# Patient Record
Sex: FEMALE | Race: BLACK OR AFRICAN AMERICAN | Employment: FULL TIME | URBAN - METROPOLITAN AREA
[De-identification: names, ages, dates, MRNs, and addresses within clinical notes are randomized per-mention and may not be internally consistent; named-entity substitution may affect disease eponyms.]

---

## 2018-01-22 ENCOUNTER — HOSPITAL ENCOUNTER (EMERGENCY)
Age: 33
Discharge: HOME | End: 2018-01-22
Attending: EMERGENCY MEDICINE
Payer: COMMERCIAL

## 2018-01-22 ENCOUNTER — APPOINTMENT (OUTPATIENT)
Dept: GENERAL RADIOLOGY | Age: 33
End: 2018-01-22
Attending: PHYSICIAN ASSISTANT
Payer: COMMERCIAL

## 2018-01-22 VITALS
RESPIRATION RATE: 20 BRPM | SYSTOLIC BLOOD PRESSURE: 178 MMHG | OXYGEN SATURATION: 100 % | HEIGHT: 62 IN | BODY MASS INDEX: 45.27 KG/M2 | DIASTOLIC BLOOD PRESSURE: 110 MMHG | WEIGHT: 246 LBS | TEMPERATURE: 98.2 F | HEART RATE: 82 BPM

## 2018-01-22 DIAGNOSIS — R03.0 ELEVATED BLOOD PRESSURE READING: ICD-10-CM

## 2018-01-22 DIAGNOSIS — S30.0XXA CONTUSION OF COCCYX, INITIAL ENCOUNTER: Primary | ICD-10-CM

## 2018-01-22 PROCEDURE — 99283 EMERGENCY DEPT VISIT LOW MDM: CPT

## 2018-01-22 PROCEDURE — 74011250637 HC RX REV CODE- 250/637: Performed by: PHYSICIAN ASSISTANT

## 2018-01-22 PROCEDURE — 72100 X-RAY EXAM L-S SPINE 2/3 VWS: CPT

## 2018-01-22 RX ORDER — HYDROCODONE BITARTRATE AND ACETAMINOPHEN 5; 325 MG/1; MG/1
1 TABLET ORAL
Status: COMPLETED | OUTPATIENT
Start: 2018-01-22 | End: 2018-01-22

## 2018-01-22 RX ORDER — NAPROXEN 500 MG/1
500 TABLET ORAL 2 TIMES DAILY WITH MEALS
Qty: 20 TAB | Refills: 0 | Status: SHIPPED | OUTPATIENT
Start: 2018-01-22 | End: 2018-02-01

## 2018-01-22 RX ADMIN — HYDROCODONE BITARTRATE AND ACETAMINOPHEN 1 TABLET: 5; 325 TABLET ORAL at 19:51

## 2018-01-23 NOTE — ED PROVIDER NOTES
HPI Comments: Chief Complaint: low back pain  Duration:  1 week  Timing:  constant  Location: low back, coccyx  Quality: achy  Severity: moderate  Modifying Factors: worse with sitting  Associated Symptoms: none    29 yo F c/o low back pain and tailbone pain x 1 week. States she slipped on the ice and fell, landing on her buttocks. Pain worse with sitting. Has been taking aleve with some improvement in pain. Denies saddle anesthesia or incontinence. No other complaints. Patient is a 28 y.o. female presenting with coccyx pain. Tailbone Pain    Pertinent negatives include no fever and no abdominal pain. Past Medical History:   Diagnosis Date    Anemia NEC     Asthma      delivery     Gallstones     Gastrointestinal disorder        Past Surgical History:   Procedure Laterality Date    HX  SECTION      HX GASTRIC BYPASS      HX ORTHOPAEDIC      correction of bow legs         No family history on file. Social History     Social History    Marital status:      Spouse name: N/A    Number of children: N/A    Years of education: N/A     Occupational History    Not on file. Social History Main Topics    Smoking status: Current Every Day Smoker    Smokeless tobacco: Never Used      Comment: cigar 2 a day    Alcohol use Yes      Comment: occational   liquor drinker    Drug use: No    Sexual activity: Yes     Partners: Male     Birth control/ protection: Condom     Other Topics Concern    Not on file     Social History Narrative         ALLERGIES: Review of patient's allergies indicates no known allergies. Review of Systems   Constitutional: Negative for fever. Gastrointestinal: Negative for abdominal pain. Musculoskeletal: Positive for back pain. Negative for gait problem and neck pain. All other systems reviewed and are negative.       Vitals:    18 1817   BP: (!) 178/110   Pulse: 82   Resp: 20   Temp: 98.2 °F (36.8 °C)   SpO2: 100%   Weight: 111.6 kg (246 lb)   Height: 5' 2\" (1.575 m)            Physical Exam   Constitutional: She is oriented to person, place, and time. She appears well-developed and well-nourished. No distress. HENT:   Head: Normocephalic and atraumatic. Eyes: Conjunctivae are normal.   Neck: Normal range of motion. Neck supple. Cardiovascular: Normal rate, regular rhythm and normal heart sounds. Pulmonary/Chest: Effort normal and breath sounds normal. No respiratory distress. She has no wheezes. She has no rales. Musculoskeletal: Normal range of motion. Moderately TTP to left lumbar paraspinal muscles, left SI joint, sacrum and coccyx. No swelling or ecchymosis. Neurological: She is alert and oriented to person, place, and time. Skin: Skin is warm and dry. Psychiatric: She has a normal mood and affect. Her behavior is normal. Judgment and thought content normal.   Nursing note and vitals reviewed. MDM  Number of Diagnoses or Management Options  Contusion of coccyx, initial encounter:   Elevated blood pressure reading:     ED Course       Procedures    -------------------------------------------------------------------------------------------------------------------     EKG INTERPRETATIONS:      RADIOLOGY RESULTS:   XR SPINE LUMB 2 OR 3 V    (Results Pending)       LABORATORY RESULTS:  No results found for this or any previous visit (from the past 12 hour(s)). PROGRESS NOTES:    8:40 PM Pt well appearing and in NAD. Nothing acute on x-ray. No red flags. Will d/h to f/u with PCP for further eval.     Lengthy D/W pt regarding possible worsening of pt's condition, need for follow up and strict ED return instructions for any worsening symptoms. DISPOSITION:  ED DIAGNOSIS & DISPOSITION INFORMATION  Diagnosis:   1. Contusion of coccyx, initial encounter    2.  Elevated blood pressure reading          Disposition: home    Follow-up Information     Follow up With Details [address] [facility] EMERGENCY DEPT Immediately if symptoms worsen [address] [id number]  [phone]          Patient's Medications   Start Taking    MISCELLANEOUS MEDICAL SUPPLY [facility]    Donut cushion    NAPROXEN (NAPROSYN) 500 MG TABLET    Take 1 Tab by mouth two (2) times daily (with meals) for 10 days. Continue Taking    NAPROXEN (NAPROSYN) 500 MG TABLET    Take 1 Tab by mouth two (2) times daily (with meals).    These Medications have changed    No medications on file   Stop Taking    No medications on file

## 2018-01-23 NOTE — ED NOTES
Samuel Keller is a 28 y.o. female that was discharged in stable. Pt was accompanied by friend. Pt is not driving. The patients diagnosis, condition and treatment were explained to  patient and aftercare instructions were given. The patient verbalized understanding. Patient armband removed and shredded.

## 2018-02-02 ENCOUNTER — OFFICE VISIT (OUTPATIENT)
Dept: FAMILY MEDICINE CLINIC | Facility: CLINIC | Age: 33
End: 2018-02-02

## 2018-02-02 ENCOUNTER — HOSPITAL ENCOUNTER (INPATIENT)
Age: 33
LOS: 1 days | Discharge: HOME | DRG: 812 | End: 2018-02-04
Attending: EMERGENCY MEDICINE | Admitting: INTERNAL MEDICINE
Payer: COMMERCIAL

## 2018-02-02 VITALS
WEIGHT: 249 LBS | HEIGHT: 62 IN | HEART RATE: 101 BPM | BODY MASS INDEX: 45.82 KG/M2 | SYSTOLIC BLOOD PRESSURE: 146 MMHG | TEMPERATURE: 96.6 F | DIASTOLIC BLOOD PRESSURE: 62 MMHG | RESPIRATION RATE: 16 BRPM

## 2018-02-02 DIAGNOSIS — Z98.84 H/O GASTRIC BYPASS: ICD-10-CM

## 2018-02-02 DIAGNOSIS — D64.9 ANEMIA, UNSPECIFIED TYPE: Primary | ICD-10-CM
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
## 2018-02-02 DIAGNOSIS — R40.20 LOSS OF CONSCIOUSNESS (HCC): ICD-10-CM

## 2018-02-02 PROBLEM — D50.9 MICROCYTIC HYPOCHROMIC ANEMIA: Status: ACTIVE | Noted: 2018-02-02

## 2018-02-02 LAB
ALBUMIN SERPL-MCNC: 3.5 G/DL (ref 3.4–5)
ALBUMIN SERPL-MCNC: 3.6 G/DL (ref 3.4–5)
ALBUMIN/GLOB SERPL: 0.8 {RATIO} (ref 0.8–1.7)
ALBUMIN/GLOB SERPL: 0.9 {RATIO} (ref 0.8–1.7)
ALP SERPL-CCNC: 83 U/L (ref 45–117)
ALP SERPL-CCNC: 84 U/L (ref 45–117)
ALT SERPL-CCNC: 17 U/L (ref 13–56)
ALT SERPL-CCNC: 18 U/L (ref 13–56)
ANION GAP SERPL CALC-SCNC: 6 MMOL/L (ref 3–18)
ANION GAP SERPL CALC-SCNC: 8 MMOL/L (ref 3–18)
AST SERPL-CCNC: 18 U/L (ref 15–37)
AST SERPL-CCNC: 26 U/L (ref 15–37)
BASOPHILS # BLD: 0 K/UL (ref 0–0.06)
BASOPHILS NFR BLD: 0 % (ref 0–2)
BILIRUB DIRECT SERPL-MCNC: 0.1 MG/DL (ref 0–0.2)
BILIRUB SERPL-MCNC: 0.5 MG/DL (ref 0.2–1)
BILIRUB SERPL-MCNC: 0.5 MG/DL (ref 0.2–1)
BUN SERPL-MCNC: 10 MG/DL (ref 7–18)
BUN SERPL-MCNC: 10 MG/DL (ref 7–18)
BUN/CREAT SERPL: 15 (ref 12–20)
BUN/CREAT SERPL: 15 (ref 12–20)
CALCIUM SERPL-MCNC: 8.7 MG/DL (ref 8.5–10.1)
CALCIUM SERPL-MCNC: 8.8 MG/DL (ref 8.5–10.1)
CHLORIDE SERPL-SCNC: 107 MMOL/L (ref 100–108)
CHLORIDE SERPL-SCNC: 107 MMOL/L (ref 100–108)
CO2 SERPL-SCNC: 26 MMOL/L (ref 21–32)
CO2 SERPL-SCNC: 26 MMOL/L (ref 21–32)
CREAT SERPL-MCNC: 0.65 MG/DL (ref 0.6–1.3)
CREAT SERPL-MCNC: 0.67 MG/DL (ref 0.6–1.3)
DIFFERENTIAL METHOD BLD: ABNORMAL
EOSINOPHIL # BLD: 0.4 K/UL (ref 0–0.4)
EOSINOPHIL NFR BLD: 4 % (ref 0–5)
ERYTHROCYTE [DISTWIDTH] IN BLOOD BY AUTOMATED COUNT: 29.4 % (ref 11.6–14.5)
FOLATE SERPL-MCNC: 10.5 NG/ML (ref 3.1–17.5)
GLOBULIN SER CALC-MCNC: 4.1 G/DL (ref 2–4)
GLOBULIN SER CALC-MCNC: 4.8 G/DL (ref 2–4)
GLUCOSE SERPL-MCNC: 78 MG/DL (ref 74–99)
GLUCOSE SERPL-MCNC: 82 MG/DL (ref 74–99)
HCG SERPL QL: NEGATIVE
HCT VFR BLD AUTO: 16.9 % (ref 35–45)
HGB BLD-MCNC: 4.4 G/DL (ref 12–16)
HGB BLD-MCNC: 4.5 G/DL
IRON SATN MFR SERPL: 2 %
IRON SERPL-MCNC: 13 UG/DL (ref 50–175)
LYMPHOCYTES # BLD: 3 K/UL (ref 0.9–3.6)
LYMPHOCYTES NFR BLD: 31 % (ref 21–52)
MAGNESIUM SERPL-MCNC: 2.3 MG/DL (ref 1.6–2.6)
MCH RBC QN AUTO: 15.1 PG (ref 24–34)
MCHC RBC AUTO-ENTMCNC: 26 G/DL (ref 31–37)
MCV RBC AUTO: 57.9 FL (ref 74–97)
MONOCYTES # BLD: 0.6 K/UL (ref 0.05–1.2)
MONOCYTES NFR BLD: 6 % (ref 3–10)
NEUTS SEG # BLD: 5.7 K/UL (ref 1.8–8)
NEUTS SEG NFR BLD: 59 % (ref 40–73)
PLATELET # BLD AUTO: 615 K/UL (ref 135–420)
PLATELET COMMENTS,PCOM: ABNORMAL
POTASSIUM SERPL-SCNC: 3.7 MMOL/L (ref 3.5–5.5)
POTASSIUM SERPL-SCNC: 4.1 MMOL/L (ref 3.5–5.5)
PROT SERPL-MCNC: 7.6 G/DL (ref 6.4–8.2)
PROT SERPL-MCNC: 8.4 G/DL (ref 6.4–8.2)
RBC # BLD AUTO: 2.92 M/UL (ref 4.2–5.3)
RBC MORPH BLD: ABNORMAL
RETICS/RBC NFR AUTO: 0.4 % (ref 0.5–2.3)
SODIUM SERPL-SCNC: 139 MMOL/L (ref 136–145)
SODIUM SERPL-SCNC: 141 MMOL/L (ref 136–145)
TIBC SERPL-MCNC: 571 UG/DL (ref 250–450)
VIT B12 SERPL-MCNC: 359 PG/ML (ref 211–911)
WBC # BLD AUTO: 9.7 K/UL (ref 4.6–13.2)

## 2018-02-02 PROCEDURE — 85025 COMPLETE CBC W/AUTO DIFF WBC: CPT | Performed by: PHYSICIAN ASSISTANT

## 2018-02-02 PROCEDURE — 99284 EMERGENCY DEPT VISIT MOD MDM: CPT

## 2018-02-02 PROCEDURE — 83615 LACTATE (LD) (LDH) ENZYME: CPT | Performed by: PHYSICIAN ASSISTANT

## 2018-02-02 PROCEDURE — 83010 ASSAY OF HAPTOGLOBIN QUANT: CPT | Performed by: PHYSICIAN ASSISTANT

## 2018-02-02 PROCEDURE — 80053 COMPREHEN METABOLIC PANEL: CPT

## 2018-02-02 PROCEDURE — 36430 TRANSFUSION BLD/BLD COMPNT: CPT

## 2018-02-02 PROCEDURE — 86920 COMPATIBILITY TEST SPIN: CPT

## 2018-02-02 PROCEDURE — 85045 AUTOMATED RETICULOCYTE COUNT: CPT | Performed by: PHYSICIAN ASSISTANT

## 2018-02-02 PROCEDURE — 82746 ASSAY OF FOLIC ACID SERUM: CPT | Performed by: PHYSICIAN ASSISTANT

## 2018-02-02 PROCEDURE — 83021 HEMOGLOBIN CHROMOTOGRAPHY: CPT | Performed by: PHYSICIAN ASSISTANT

## 2018-02-02 PROCEDURE — 86900 BLOOD TYPING SEROLOGIC ABO: CPT

## 2018-02-02 PROCEDURE — 82248 BILIRUBIN DIRECT: CPT | Performed by: PHYSICIAN ASSISTANT

## 2018-02-02 PROCEDURE — 83550 IRON BINDING TEST: CPT | Performed by: PHYSICIAN ASSISTANT

## 2018-02-02 PROCEDURE — 80053 COMPREHEN METABOLIC PANEL: CPT | Performed by: PHYSICIAN ASSISTANT

## 2018-02-02 PROCEDURE — 84703 CHORIONIC GONADOTROPIN ASSAY: CPT | Performed by: PHYSICIAN ASSISTANT

## 2018-02-02 PROCEDURE — P9016 RBC LEUKOCYTES REDUCED: HCPCS

## 2018-02-02 PROCEDURE — 83735 ASSAY OF MAGNESIUM: CPT | Performed by: PHYSICIAN ASSISTANT

## 2018-02-02 RX ORDER — ALBUTEROL SULFATE 1.25 MG/3ML
1.25 SOLUTION RESPIRATORY (INHALATION)
Status: DISCONTINUED | OUTPATIENT
Start: 2018-02-02 | End: 2018-02-04 | Stop reason: HOSPADM

## 2018-02-02 RX ORDER — ACETAMINOPHEN 325 MG/1
650 TABLET ORAL
Status: DISCONTINUED | OUTPATIENT
Start: 2018-02-02 | End: 2018-02-04 | Stop reason: SDUPTHER

## 2018-02-02 RX ORDER — SODIUM CHLORIDE 9 MG/ML
250 INJECTION, SOLUTION INTRAVENOUS AS NEEDED
Status: DISCONTINUED | OUTPATIENT
Start: 2018-02-02 | End: 2018-02-03

## 2018-02-02 RX ORDER — SODIUM CHLORIDE 0.9 % (FLUSH) 0.9 %
5-10 SYRINGE (ML) INJECTION AS NEEDED
Status: DISCONTINUED | OUTPATIENT
Start: 2018-02-02 | End: 2018-02-04 | Stop reason: HOSPADM

## 2018-02-02 RX ORDER — ONDANSETRON 2 MG/ML
4 INJECTION INTRAMUSCULAR; INTRAVENOUS
Status: DISCONTINUED | OUTPATIENT
Start: 2018-02-02 | End: 2018-02-04 | Stop reason: HOSPADM

## 2018-02-02 RX ORDER — FAMOTIDINE 20 MG/1
20 TABLET, FILM COATED ORAL 2 TIMES DAILY
Status: DISCONTINUED | OUTPATIENT
Start: 2018-02-03 | End: 2018-02-03

## 2018-02-02 RX ORDER — SODIUM CHLORIDE 0.9 % (FLUSH) 0.9 %
5-10 SYRINGE (ML) INJECTION EVERY 8 HOURS
Status: DISCONTINUED | OUTPATIENT
Start: 2018-02-02 | End: 2018-02-04 | Stop reason: HOSPADM

## 2018-02-02 NOTE — PATIENT INSTRUCTIONS
Anemia: Care Instructions  Your Care Instructions    Anemia is a low level of red blood cells, which carry oxygen throughout your body. Many things can cause anemia. Lack of iron is one of the most common causes. Your body needs iron to make hemoglobin, a substance in red blood cells that carries oxygen from the lungs to your body's cells. Without enough iron, the body produces fewer and smaller red blood cells. As a result, your body's cells do not get enough oxygen, and you feel tired and weak. And you may have trouble concentrating. Bleeding is the most common cause of a lack of iron. You may have heavy menstrual bleeding or bleeding caused by conditions such as ulcers, hemorrhoids, or cancer. Regular use of aspirin or other anti-inflammatory medicines (such as ibuprofen) also can cause bleeding in some people. A lack of iron in your diet also can cause anemia, especially at times when the body needs more iron, such as during pregnancy, infancy, and the teen years. Your doctor may have prescribed iron pills. It may take several months of treatment for your iron levels to return to normal. Your doctor also may suggest that you eat foods that are rich in iron, such as meat and beans. There are many other causes of anemia. It is not always due to a lack of iron. Finding the specific cause of your anemia will help your doctor find the right treatment for you. Follow-up care is a key part of your treatment and safety. Be sure to make and go to all appointments, and call your doctor if you are having problems. It's also a good idea to know your test results and keep a list of the medicines you take. How can you care for yourself at home? · Take your medicines exactly as prescribed. Call your doctor if you think you are having a problem with your medicine. · If your doctor recommends iron pills, take them as directed:  ¨ Try to take the pills on an empty stomach about 1 hour before or 2 hours after meals. But you may need to take iron with food to avoid an upset stomach. ¨ Do not take antacids or drink milk or caffeine drinks (such as coffee, tea, or cola) at the same time or within 2 hours of the time that you take your iron. They can make it hard for your body to absorb the iron. ¨ Vitamin C (from food or supplements) helps your body absorb iron. Try taking iron pills with a glass of orange juice or some other food that is high in vitamin C, such as citrus fruits. ¨ Iron pills may cause stomach problems, such as heartburn, nausea, diarrhea, constipation, and cramps. Be sure to drink plenty of fluids, and include fruits, vegetables, and fiber in your diet each day. Iron pills often make your bowel movements dark or green. ¨ If you forget to take an iron pill, do not take a double dose of iron the next time you take a pill. ¨ Keep iron pills out of the reach of small children. An overdose of iron can be very dangerous. · Follow your doctor's advice about eating iron-rich foods. These include red meat, shellfish, poultry, eggs, beans, raisins, whole-grain bread, and leafy green vegetables. · Steam vegetables to help them keep their iron content. When should you call for help? Call 911 anytime you think you may need emergency care. For example, call if:  ? · You have symptoms of a heart attack. These may include:  ¨ Chest pain or pressure, or a strange feeling in the chest.  ¨ Sweating. ¨ Shortness of breath. ¨ Nausea or vomiting. ¨ Pain, pressure, or a strange feeling in the back, neck, jaw, or upper belly or in one or both shoulders or arms. ¨ Lightheadedness or sudden weakness. ¨ A fast or irregular heartbeat. After you call 911, the  may tell you to chew 1 adult-strength or 2 to 4 low-dose aspirin. Wait for an ambulance. Do not try to drive yourself. ? · You passed out (lost consciousness).    ?Call your doctor now or seek immediate medical care if:  ? · You have new or increased shortness of breath. ? · You are dizzy or lightheaded, or you feel like you may faint. ? · Your fatigue and weakness continue or get worse. ? · You have any abnormal bleeding, such as:  ¨ Nosebleeds. ¨ Vaginal bleeding that is different (heavier, more frequent, at a different time of the month) than what you are used to. ¨ Bloody or black stools, or rectal bleeding. ¨ Bloody or pink urine. ? Watch closely for changes in your health, and be sure to contact your doctor if:  ? · You do not get better as expected. Where can you learn more? Go to http://yaa-lucinaa.info/. Enter R301 in the search box to learn more about \"Anemia: Care Instructions. \"  Current as of: October 13, 2016  Content Version: 11.4  © 8610-9988 [employer]. Care instructions adapted under license by [employer] (which disclaims liability or warranty for this information). If you have questions about a medical condition or this instruction, always ask your healthcare professional. [place] [address] any warranty or liability for your use of this information. Preventing Falls: Care Instructions  Your Care Instructions    Getting around your home safely can be a challenge if you have injuries or health problems that make it easy for you to fall. Loose rugs and furniture in walkways are among the dangers for many older people who have problems walking or who have poor eyesight. People who have conditions such as arthritis, osteoporosis, or dementia also have to be careful not to fall. You can make your home safer with a few simple measures. Follow-up care is a key part of your treatment and safety. Be sure to make and go to all appointments, and call your doctor if you are having problems. It's also a good idea to know your test results and keep a list of the medicines you take. How can you care for yourself at home?   Taking care of yourself  · You may get dizzy if you do not drink enough water. To prevent dehydration, drink plenty of fluids, enough so that your urine is light yellow or clear like water. Choose water and other caffeine-free clear liquids. If you have kidney, heart, or liver disease and have to limit fluids, talk with your doctor before you increase the amount of fluids you drink. · Exercise regularly to improve your strength, muscle tone, and balance. Walk if you can. Swimming may be a good choice if you cannot walk easily. · Have your vision and hearing checked each year or any time you notice a change. If you have trouble seeing and hearing, you might not be able to avoid objects and could lose your balance. · Know the side effects of the medicines you take. Ask your doctor or pharmacist whether the medicines you take can affect your balance. Sleeping pills or sedatives can affect your balance. · Limit the amount of alcohol you drink. Alcohol can impair your balance and other senses. · Ask your doctor whether calluses or corns on your feet need to be removed. If you wear loose-fitting shoes because of calluses or corns, you can lose your balance and fall. · Talk to your doctor if you have numbness in your feet. Preventing falls at home  · Remove raised doorway thresholds, throw rugs, and clutter. Repair loose carpet or raised areas in the floor. · Move furniture and electrical cords to keep them out of walking paths. · Use nonskid floor wax, and wipe up spills right away, especially on ceramic tile floors. · If you use a walker or cane, put rubber tips on it. If you use crutches, clean the bottoms of them regularly with an abrasive pad, such as steel wool. · Keep your house well lit, especially [place], and outside walkways. Use night-lights in areas such as hallways and bathrooms.  Add extra light switches or use remote switches (such as switches that go on or off when you clap your hands) to make it easier to turn lights on if you have to get up during the night. · Install sturdy handrails on stairways. · Move items in your cabinets so that the things you use a lot are on the lower shelves (about waist level). · Keep a cordless phone and a flashlight with new batteries by your bed. If possible, put a phone in each of the main rooms of your house, or carry a cell phone in case you fall and cannot reach a phone. Or, you can wear a device around your neck or wrist. You push a button that sends a signal for help. · Wear low-heeled shoes that fit well and give your feet good support. Use footwear with nonskid soles. Check the heels and soles of your shoes for wear. Repair or replace worn heels or soles. · Do not wear socks without shoes on wood floors. · Walk on the grass when the sidewalks are slippery. If you live in an area that gets snow and ice in the winter, sprinkle salt on slippery steps and sidewalks. Preventing falls in the bath  · Install grab bars and nonskid mats inside and outside your shower or tub and near the toilet and sinks. · Use shower chairs and bath benches. · Use a hand-held shower head that will allow you to sit while showering. · Get into a tub or shower by putting the weaker leg in first. Get out of a tub or shower with your strong side first.  · Repair loose toilet seats and consider installing a raised toilet seat to make getting on and off the toilet easier. · Keep your bathroom door unlocked while you are in the shower. Where can you learn more? Go to http://yaa-luciana.info/. Enter 0476 79 69 [id number] in the search box to learn more about \"Preventing Falls: Care Instructions. \"  Current as of: May 12, 2017  Content Version: 11.4  © 7959-0648 [employer]. Care instructions adapted under license by [employer] (which disclaims liability or warranty for this information).  If you have questions about a medical condition or this instruction, always ask your healthcare professional. [employer] disclaims any warranty or liability for your use of this information.

## 2018-02-02 NOTE — MR AVS SNAPSHOT
[address] [facility] 
 
 
 [address] [facility] [address] [facility] [address] 
[phone] Patient: Arjun Stapleton MRN: [id number] :1985 Visit Information Date & Time Provider Department Dept. Phone Encounter #  
 2018  3:45 PM Marisa BruceremyABRIL [employer] [phone] [id number] Follow-up Instructions Return in about 1 week (around 2018), or if symptoms worsen or fail to improve. Your Appointments 2018 12:00 PM  
New Patient with Tono Niño MD  
[facility] [address] [place] [address]) Appt Note: np Back and thigh pain bring pic id, ins crd, list of med  arrive 30 min early   
 [address] [facility], [address]  
  
   
 [address] [facility], [address] [facility] [address] Upcoming Health Maintenance Date Due Pneumococcal 19-64 Medium Risk (1 of 1 - PPSV23) 2004 DTaP/Tdap/Td series (1 - Tdap) 2006 PAP AKA CERVICAL CYTOLOGY 2006 Influenza Age 5 to Adult 2017 Allergies as of 2018  Review Complete On: 2018 By: Malik Shown No Known Allergies Current Immunizations  Never Reviewed No immunizations on file. Not reviewed this visit You Were Diagnosed With   
  
 Codes Comments Anemia, unspecified type    -  Primary ICD-10-CM: D64.9 ICD-9-CM: 849. 9 Class 3 obesity with serious comorbidity and body mass index (BMI) of 45.0 to 49.9 in adult, unspecified obesity type ([facility] [address]     ICD-10-CM: E66.9, Z68.42 
ICD-9-CM: 278.00, V85.42 [facility]     ICD-10-CM: R40.20 ICD-9-CM: 780.09 Fall, initial encounter     ICD-10-CM: W19. Leanne Rodriguez ICD-9-CM: E888.9 H/O gastric bypass     ICD-10-CM: Z98.890 ICD-9-CM: V45.86 Vitals BP Pulse Temp Resp Height(growth percentile) Weight(growth percentile) 146/62 (!) 101 96.6 °F (35.9 °C) 16 5' 1.5\" (1.562 m) 249 lb (112.9 kg) LMP BMI OB Status Smoking Status 11/20/2017 46.29 kg/m2 Unknown Current Every Day Smoker Vitals History BMI and BSA Data Body Mass Index Body Surface Area  
 46.29 kg/m 2 2.21 m 2 Preferred Pharmacy Pharmacy Name Phone [facility] [address] [place] [address] [phone] Your Updated Medication List  
  
   
This list is accurate as of: 2/2/18  4:44 PM.  Always use your most recent med list.  
  
  
  
  
 miscellaneous medical supply Misc Donut cushion  
  
 naproxen 500 mg tablet Commonly known as:  NAPROSYN Take 1 Tab by mouth two (2) times daily (with meals). We Performed the Following AMB POC HEMOGLOBIN (HGB) [38848 CPT(R)] Follow-up Instructions Return in about 1 week (around 2/9/2018), or if symptoms worsen or fail to improve. To-Do List   
 02/02/2018 Lab:  CBC WITH AUTOMATED DIFF   
  
 02/02/2018 Lab:  HEMOGLOBIN A1C WITH EAG   
  
 02/02/2018 Lab:  METABOLIC PANEL, COMPREHENSIVE   
  
 02/02/2018 Lab:  TSH 3RD GENERATION   
  
 02/09/2018 Imaging:  CT HEAD W CONT   
  
 02/15/2018 Lab:  LIPID PANEL Referral Information Referral ID Referred By Referred To  
  
 [id number] Domenica SANTIAGO Not Available Visits Status Start Date End Date 1 New Request 2/2/18 2/2/19 If your referral has a status of pending review or denied, additional information will be sent to support the outcome of this decision. Patient Instructions Anemia: Care Instructions Your Care Instructions Anemia is a low level of red blood cells, which carry oxygen throughout your body. Many things can cause anemia. Lack of iron is one of the most common causes.  Your body needs iron to make hemoglobin, a substance in red blood cells that carries oxygen from the lungs to your body's cells. Without enough iron, the body produces fewer and smaller red blood cells. As a result, your body's cells do not get enough oxygen, and you feel tired and weak. And you may have trouble concentrating. Bleeding is the most common cause of a lack of iron. You may have heavy menstrual bleeding or bleeding caused by conditions such as ulcers, hemorrhoids, or cancer. Regular use of aspirin or other anti-inflammatory medicines (such as ibuprofen) also can cause bleeding in some people. A lack of iron in your diet also can cause anemia, especially at times when the body needs more iron, such as during pregnancy, infancy, and the teen years. Your doctor may have prescribed iron pills. It may take several months of treatment for your iron levels to return to normal. Your doctor also may suggest that you eat foods that are rich in iron, such as meat and beans. There are many other causes of anemia. It is not always due to a lack of iron. Finding the specific cause of your anemia will help your doctor find the right treatment for you. Follow-up care is a key part of your treatment and safety. Be sure to make and go to all appointments, and call your doctor if you are having problems. It's also a good idea to know your test results and keep a list of the medicines you take. How can you care for yourself at home? · Take your medicines exactly as prescribed. Call your doctor if you think you are having a problem with your medicine. · If your doctor recommends iron pills, take them as directed: ¨ Try to take the pills on an empty stomach about 1 hour before or 2 hours after meals. But you may need to take iron with food to avoid an upset stomach.  
¨ Do not take antacids or drink milk or caffeine drinks (such as coffee, tea, or cola) at the same time or within 2 hours of the time that you take your iron. They can make it hard for your body to absorb the iron. ¨ Vitamin C (from food or supplements) helps your body absorb iron. Try taking iron pills with a glass of orange juice or some other food that is high in vitamin C, such as citrus fruits. ¨ Iron pills may cause stomach problems, such as heartburn, nausea, diarrhea, constipation, and cramps. Be sure to drink plenty of fluids, and include fruits, vegetables, and fiber in your diet each day. Iron pills often make your bowel movements dark or green. ¨ If you forget to take an iron pill, do not take a double dose of iron the next time you take a pill. ¨ Keep iron pills out of the reach of small children. An overdose of iron can be very dangerous. · Follow your doctor's advice about eating iron-rich foods. These include red meat, shellfish, poultry, eggs, beans, raisins, whole-grain bread, and leafy green vegetables. · Steam vegetables to help them keep their iron content. When should you call for help? Call 911 anytime you think you may need emergency care. For example, call if: 
? · You have symptoms of a heart attack. These may include: ¨ Chest pain or pressure, or a strange feeling in the chest. 
¨ Sweating. ¨ Shortness of breath. ¨ Nausea or vomiting. ¨ Pain, pressure, or a strange feeling in the back, neck, jaw, or upper belly or in one or both shoulders or arms. ¨ Lightheadedness or sudden weakness. ¨ A fast or irregular heartbeat. After you call 911, the  may tell you to chew 1 adult-strength or 2 to 4 low-dose aspirin. Wait for an ambulance. Do not try to drive yourself. ? · You passed out (lost consciousness). ?Call your doctor now or seek immediate medical care if: 
? · You have new or increased shortness of breath. ? · You are dizzy or lightheaded, or you feel like you may faint. ? · Your fatigue and weakness continue or get worse. ? · You have any abnormal bleeding, such as: 
¨ Nosebleeds. ¨ Vaginal bleeding that is different (heavier, more frequent, at a different time of the month) than what you are used to. ¨ Bloody or black stools, or rectal bleeding. ¨ Bloody or pink urine. ? Watch closely for changes in your health, and be sure to contact your doctor if: 
? · You do not get better as expected. Where can you learn more? Go to http://yaa-luciana.info/. Enter R301 in the search box to learn more about \"Anemia: Care Instructions. \" Current as of: October 13, 2016 Content Version: 11.4 © 2806-7522 [employer]. Care instructions adapted under license by [employer] (which disclaims liability or warranty for this information). If you have questions about a medical condition or this instruction, always ask your healthcare professional. [address] any warranty or liability for your use of this information. Preventing Falls: Care Instructions Your Care Instructions Getting around your home safely can be a challenge if you have injuries or health problems that make it easy for you to fall. Loose rugs and furniture in walkways are among the dangers for many older people who have problems walking or who have poor eyesight. People who have conditions such as arthritis, osteoporosis, or dementia also have to be careful not to fall. You can make your home safer with a few simple measures. Follow-up care is a key part of your treatment and safety. Be sure to make and go to all appointments, and call your doctor if you are having problems. It's also a good idea to know your test results and keep a list of the medicines you take. How can you care for yourself at home? Taking care of yourself · You may get dizzy if you do not drink enough water. To prevent dehydration, drink plenty of fluids, enough so that your urine is light yellow or clear like water.  Choose water and other caffeine-free clear liquids. If you have kidney, heart, or liver disease and have to limit fluids, talk with your doctor before you increase the amount of fluids you drink. · Exercise regularly to improve your strength, muscle tone, and balance. Walk if you can. Swimming may be a good choice if you cannot walk easily. · Have your vision and hearing checked each year or any time you notice a change. If you have trouble seeing and hearing, you might not be able to avoid objects and could lose your balance. · Know the side effects of the medicines you take. Ask your doctor or pharmacist whether the medicines you take can affect your balance. Sleeping pills or sedatives can affect your balance. · Limit the amount of alcohol you drink. Alcohol can impair your balance and other senses. · Ask your doctor whether calluses or corns on your feet need to be removed. If you wear loose-fitting shoes because of calluses or corns, you can lose your balance and fall. · Talk to your doctor if you have numbness in your feet. Preventing falls at home · Remove raised doorway thresholds, throw rugs, and clutter. Repair loose carpet or raised areas in the floor. · Move furniture and electrical cords to keep them out of walking paths. · Use nonskid floor wax, and wipe up spills right away, especially on ceramic tile floors. · If you use a walker or cane, put rubber tips on it. If you use crutches, clean the bottoms of them regularly with an abrasive pad, such as steel wool. · Keep your house well lit, especially [place], and outside walkways. Use night-lights in areas such as hallways and bathrooms. Add extra light switches or use remote switches (such as switches that go on or off when you clap your hands) to make it easier to turn lights on if you have to get up during the night. · Install sturdy handrails on stairways. · Move items in your cabinets so that the things you use a lot are on the lower shelves (about waist level). · Keep a cordless phone and a flashlight with new batteries by your bed. If possible, put a phone in each of the main rooms of your house, or carry a cell phone in case you fall and cannot reach a phone. Or, you can wear a device around your neck or wrist. You push a button that sends a signal for help. · Wear low-heeled shoes that fit well and give your feet good support. Use footwear with nonskid soles. Check the heels and soles of your shoes for wear. Repair or replace worn heels or soles. · Do not wear socks without shoes on wood floors. · Walk on the grass when the sidewalks are slippery. If you live in an area that gets snow and ice in the winter, sprinkle salt on slippery steps and sidewalks. Preventing falls in the bath · Install grab bars and nonskid mats inside and outside your shower or tub and near the toilet and sinks. · Use shower chairs and bath benches. · Use a hand-held shower head that will allow you to sit while showering. · Get into a tub or shower by putting the weaker leg in first. Get out of a tub or shower with your strong side first. 
· Repair loose toilet seats and consider installing a raised toilet seat to make getting on and off the toilet easier. · Keep your bathroom door unlocked while you are in the shower. Where can you learn more? Go to http://yaa-luciana.info/. Enter 0476 79 69 [phone] in the search box to learn more about \"Preventing Falls: Care Instructions. \" Current as of: May 12, 2017 Content Version: 11.4 © 7072-8475 [employer]. Care instructions adapted under license by [employer] (which disclaims liability or warranty for this information). If you have questions about a medical condition or this instruction, always ask your healthcare professional. [address] any warranty or liability for your use of this information. Introducing hospitals & HEALTH SERVICES! Dear Penelope Course: Thank you for requesting a [employer] account. Our records indicate that you already have an active [employer] account. You can access your account anytime at [email or web address] Did you know that you can access your hospital and ER discharge instructions at any time in [employer]? You can also review all of your test results from your hospital stay or ER visit. Additional Information If you have questions, please visit the Frequently Asked Questions section of the [employer] website at [email or web address] Remember, [employer]ia is NOT to be used for urgent needs. For medical emergencies, dial 911. Now available from your iPhone and Android! Please provide this summary of care documentation to your next provider. Your primary care clinician is listed as NONE. If you have any questions after today's visit, please call [phone].

## 2018-02-02 NOTE — IP AVS SNAPSHOT
[address] Patient: Chary Monge MRN: [id number] :1985 A check live indicates which time of day the medication should be taken. My Medications START taking these medications Instructions Each Dose to Equal  
 Morning Noon Evening Bedtime  
 docusate sodium 100 mg capsule Commonly known as:  Aloma Dears Your next dose is:  2018 Take 1 Cap by mouth two (2) times a day. 100 mg  
    
   
   
   
  
 ferrous sulfate 325 mg (65 mg iron) EC tablet Commonly known as:  IRON Your next dose is:  2018 Take 1 Tab by mouth Daily (before breakfast). 325 mg  
    
   
   
   
  
 nicotine 7 mg/24 hr  
Commonly known as:  Jolan Shines Your next dose is:  2018 1 Patch by TransDERmal route every twenty-four (24) hours. 1 Patch OTHER Check CBC, CMP, Mg in 3 days, results to PCP immediately, diagnosis- Anemia OTHER Notes to Patient:  10 times an hour Incentive spirometry- use as directed OTHER  
   
 Graded Compression Stockings b/l LE- use as directed OTHER Physical Therapy- Evaluate and Treat OTHER Your next dose is:  2018 Multivitamin- Patient states she was on a gummy vitamin recommended by her bariatric surgeon and I have advised her to resume that as directed OTHER This is to certify that Rosa M Johnston was admitted to [facility] on 18 and discharged on 18 , and has been advised to take rest at home for 7 ( seven ) more days and then resume work if symptom free. pantoprazole 40 mg tablet Commonly known as:  PROTONIX Your next dose is:  2018 Take 1 Tab by mouth daily.   
 40 mg  
    
   
   
   
  
  
 STOP taking these medications   
 naproxen 500 mg tablet Commonly known as:  NAPROSYN  
   
  
  
ASK your doctor about these medications Instructions Each Dose to Equal  
 Morning Noon Evening Bedtime  
 miscellaneous medical supply Misc Donut cushion Where to Get Your Medications Information on where to get these meds will be given to you by the nurse or doctor. ! Ask your nurse or doctor about these medications  
  docusate sodium 100 mg capsule  
 ferrous sulfate 325 mg (65 mg iron) EC tablet  
 nicotine 7 mg/24 hr  
 OTHER  
 pantoprazole 40 mg tablet

## 2018-02-02 NOTE — ED TRIAGE NOTES
Pt states was sent here by PCP due to hemoglobin of 4.  Pt presents lethargic with some pallor to lips

## 2018-02-02 NOTE — ED PROVIDER NOTES
EMERGENCY DEPARTMENT HISTORY AND PHYSICAL EXAM    6:39 PM      Date: 2018  Patient Name: Lobo Kirkland    History of Presenting Illness     Chief Complaint   Patient presents with    Anemia         History Provided By: Patient    Chief Complaint: fatigue, headaches, low h/h  Duration:  months  Timing:  chronic  Location: diffuse HA  Quality: Aching  Severity: Mild  Modifying Factors: None  Associated Symptoms: syncope, numbness/tingling, weakness      Additional History (Context): Lobo Kirkland is a 28 y.o. female with anemia, asthma and past surgical hx of gastric bypass who presents with c/o intermittent headaches and fatigue x months. She was evaluated by a new PMD today who found her hemoglobin was 4.5, she was then sent to the ED. Pt notes hx of anemia, had 1 iron transfusion in . Does not take iron tablets. No hx of blood transfusion. No hx of hematology work-up in the past. Denies fever/chills, dyspnea on exertion, chest pain on exertion, n/v, hematemesis, melena, BRBPR, vaginal bleeding. LMP: November, hx of irregular cycles in the past.     PCP: None    Current Facility-Administered Medications   Medication Dose Route Frequency Provider Last Rate Last Dose    0.9% sodium chloride infusion 250 mL  250 mL IntraVENous PRN Nadya Ends [place]         Current Outpatient Prescriptions   Medication Sig Dispense Refill    miscellaneous medical supply [facility] Donut cushion 1 Each 0    naproxen (NAPROSYN) 500 mg tablet Take 1 Tab by mouth two (2) times daily (with meals).  14 Tab 0       Past History     Past Medical History:  Past Medical History:   Diagnosis Date    Anemia NEC     Asthma      delivery     Gallstones     Gastrointestinal disorder        Past Surgical History:  Past Surgical History:   Procedure Laterality Date    HX  SECTION      HX GASTRIC BYPASS      HX ORTHOPAEDIC      correction of bow legs       Family History:  Family History   Problem Relation Age of Onset    Hypertension Mother        Social History:  Social History   Substance Use Topics    Smoking status: Current Every Day Smoker    Smokeless tobacco: Never Used      Comment: cigar 2 a day    Alcohol use Yes      Comment: occational   liquor drinker       Allergies:  No Known Allergies      Review of Systems       Review of Systems   Constitutional: Positive for fatigue. Negative for chills and fever. Respiratory: Negative for shortness of breath. Cardiovascular: Negative for chest pain. Gastrointestinal: Positive for constipation. Negative for abdominal pain, anal bleeding, blood in stool, nausea and vomiting. Skin: Negative for rash. Neurological: Positive for syncope and headaches. Negative for weakness. All other systems reviewed and are negative. Physical Exam     Visit Vitals    /83    Pulse 95    Temp 96.9 °F (36.1 °C)    Resp 14    SpO2 100%         Physical Exam   Constitutional: She appears well-developed and well-nourished. No distress. HENT:   Head: Normocephalic and atraumatic. Eyes:   Pale conjunctiva   Neck: Normal range of motion. Neck supple. Cardiovascular: Normal rate, regular rhythm, normal heart sounds and intact distal pulses. Exam reveals no gallop and no friction rub. No murmur heard. Pulmonary/Chest: Effort normal and breath sounds normal. No respiratory distress. She has no wheezes. She has no rales. Abdominal: Soft. Bowel sounds are normal. She exhibits no distension and no mass. There is no tenderness. There is no rebound and no guarding. Genitourinary: Rectal exam shows guaiac negative stool. Neurological: She is alert. Skin: Skin is warm. No rash noted. She is not diaphoretic. Nursing note and vitals reviewed.         Diagnostic Study Results     Labs -  Recent Results (from the past 12 hour(s))   AMB POC HEMOGLOBIN (HGB)    Collection Time: 02/02/18  4:40 PM   Result Value Ref Range    Hemoglobin (POC) 4.5 METABOLIC PANEL, COMPREHENSIVE    Collection Time: 02/02/18  6:00 PM   Result Value Ref Range    Sodium 139 136 - 145 mmol/L    Potassium 3.7 3.5 - 5.5 mmol/L    Chloride 107 100 - 108 mmol/L    CO2 26 21 - 32 mmol/L    Anion gap 6 3.0 - 18 mmol/L    Glucose 78 74 - 99 mg/dL    BUN 10 7.0 - 18 MG/DL    Creatinine 0.67 0.6 - 1.3 MG/DL    BUN/Creatinine ratio 15 12 - 20      GFR est AA >60 >60 ml/min/1.73m2    GFR est non-AA >60 >60 ml/min/1.73m2    Calcium 8.8 8.5 - 10.1 MG/DL    Bilirubin, total 0.5 0.2 - 1.0 MG/DL    ALT (SGPT) 18 13 - 56 U/L    AST (SGOT) 18 15 - 37 U/L    Alk. phosphatase 84 45 - 117 U/L    Protein, total 8.4 (H) 6.4 - 8.2 g/dL    Albumin 3.6 3.4 - 5.0 g/dL    Globulin 4.8 (H) 2.0 - 4.0 g/dL    A-G Ratio 0.8 0.8 - 1.7     TYPE & SCREEN    Collection Time: 02/02/18  6:00 PM   Result Value Ref Range    Crossmatch Expiration 02/05/2018     ABO/Rh(D) AB POSITIVE     Antibody screen NEG     CALLED TO: ASHLEY MAGANA, AT [address] [employer] [address] [facility] [address] [id number] BY ELA     Unit number [id number]     Blood component type [facility]     Unit division 00     Status of unit ALLOCATED     Crossmatch result Compatible     Unit number [id number]     Blood component type [facility]     Unit division 00     Status of unit ALLOCATED     Crossmatch result Compatible     Unit number [id number]     Blood component type [facility]     Unit division 00     Status of unit ALLOCATED     Crossmatch result Compatible    CBC WITH AUTOMATED DIFF    Collection Time: 02/02/18  6:06 PM   Result Value Ref Range    WBC 9.7 4.6 - 13.2 K/uL    RBC 2.92 (L) 4.20 - 5.30 M/uL    HGB 4.4 (LL) 12.0 - 16.0 g/dL    HCT 16.9 (LL) 35.0 - 45.0 %    MCV 57.9 (L) 74.0 - 97.0 FL    MCH 15.1 (L) 24.0 - 34.0 PG    MCHC 26.0 (L) 31.0 - 37.0 g/dL    RDW 29.4 (H) 11.6 - 14.5 %    PLATELET 217 (H) 338 - 420 K/uL    NEUTROPHILS 59 40 - 73 %    LYMPHOCYTES 31 21 - 52 %    MONOCYTES 6 3 - 10 %    EOSINOPHILS 4 0 - 5 %    BASOPHILS 0 0 - 2 %    ABS. NEUTROPHILS 5.7 1.8 - 8.0 K/UL    ABS. LYMPHOCYTES 3.0 0.9 - 3.6 K/UL    ABS. MONOCYTES 0.6 0.05 - 1.2 K/UL    ABS. EOSINOPHILS 0.4 0.0 - 0.4 K/UL    ABS. BASOPHILS 0.0 0.0 - 0.06 K/UL    DF AUTOMATED      PLATELET COMMENTS Increased Platelets      RBC COMMENTS ANISOCYTOSIS  2+        RBC COMMENTS HYPOCHROMIA  3+        RBC COMMENTS SCHISTOCYTES  1+       HCG QL SERUM    Collection Time: 02/02/18  6:06 PM   Result Value Ref Range    HCG, Ql. NEGATIVE  NEG         Radiologic Studies -   No orders to display         Medical Decision Making   I am the first provider for this patient. I reviewed the vital signs, available nursing notes, past medical history, past surgical history, family history and social history. Vital Signs-Reviewed the patient's vital signs. Pulse Oximetry Analysis -  100 on room air     Cardiac Monitor:  Rate: 78  Rhythm: sinus rhythm    Records Reviewed: Nursing Notes and Old Medical Records (Time of Review: 6:39 PM)    ED Course: Progress Notes, Reevaluation, and Consults:  7:04 PM Reviewed results with patient. Consented for transfusion. CONSULT NOTE:   7:38 PM  I spoke with Dr. Jolynn Whipple  Specialty: Hospitalist  Discussed pt's hx, disposition, and available diagnostic and imaging results. Reviewed care plans. Consulting physician agrees with plans as outlined. Admission, 3U transfusion, call ICU for admission. Written by Nicole Tiwari PA-C    Consults  7:43 PM  I spoke with Dr. Danette Shaw  Specialist: ICU  Recommends antibody testing, [facility]. Provider Notes (Medical Decision Making): 27 yo F with hx of anemia who presents due to low H/H. Hgb found to be 4.4. No menorrhagia, guiac negative stool. No dyspnea on exertion or chest pain. VS stable. Consented for blood products, 3 units pRBCs ordered. Will be admitted for transfusion and further evaluation for anemia. Stable for admission to ICU. For Hospitalized Patients:    1.  Hospitalization Decision Time:  The decision to hospitalize the patient was made by Kj Everett Stewart at 7:00pm on 2/2/2018    2. Aspirin: Aspirin was not given because the patient did not present with a stroke at the time of their Emergency Department evaluation    Diagnosis     Clinical Impression:   1. Anemia, unspecified type        Disposition: admission    Follow-up Information     None           Patient's Medications   Start Taking    No medications on file   Continue Taking    MISCELLANEOUS MEDICAL SUPPLY MISC    Donut cushion    NAPROXEN (NAPROSYN) 500 MG TABLET    Take 1 Tab by mouth two (2) times daily (with meals).    These Medications have changed    No medications on file   Stop Taking    No medications on file

## 2018-02-02 NOTE — PROGRESS NOTES
HISTORY OF PRESENT ILLNESS  Cherelle Loyola is a 28 y.o. female. HPI Comments: New pt to practice. Presents to establish care with c/o headaches, loss of consciousness, numbness and tingling or upper and lower extremities. She reports a h/o anemia, she also had gastric bypass in 2007. She is not compliant with her vitamins and supplement therapy. She reports she has been getting \"fainting spells\" with the most recent in Nov 2017. She has also had multiple falls with the most recent in January 2018. She was seen in the ED at the time. She denies any head injury. She reports her \"fainting spells\" are chronic but she has never had any workup for this. Abdominal pain: new x 1 month. Reports abdominal pain while having a BM. She does have some constipation. Denies any nausea, vomiting, diarrhea or blood in her stool. She has tried OTC NSAID with no relief. Establish Care   The history is provided by the patient. This is a new problem. Associated symptoms include abdominal pain and headaches. Headache   The history is provided by the patient. This is a new problem. The current episode started more than 1 week ago. The problem occurs constantly. The problem has not changed since onset. Associated symptoms include abdominal pain and headaches. Treatments tried: motrin. The treatment provided no relief. Numbness   The history is provided by the patient. This is a new problem. The current episode started more than 1 week ago. The problem has not changed since onset. There was left upper extremity, right lower extremity, right upper extremity and left lower extremity focality noted. Pertinent negatives include no mental status change. There has been no fever. Associated symptoms include headaches. Associated medical issues do not include bleeding disorder, seizures or CVA. Abdominal Pain   The history is provided by the patient. This is a new problem. The current episode started more than 1 week ago.  The problem has not changed since onset. Associated symptoms include abdominal pain and headaches. She has tried nothing for the symptoms. Review of Systems   Constitutional: Positive for malaise/fatigue. Respiratory: Positive for sputum production. Cardiovascular: Negative. Gastrointestinal: Positive for abdominal pain. Genitourinary: Negative. Musculoskeletal: Positive for back pain. Neurological: Positive for tingling, numbness and headaches. Past Medical History:   Diagnosis Date    Anemia NEC     Asthma      delivery     Gallstones     Gastrointestinal disorder      Past Surgical History:   Procedure Laterality Date    HX  SECTION      HX GASTRIC BYPASS      HX ORTHOPAEDIC      correction of bow legs     Current Outpatient Prescriptions on File Prior to Visit   Medication Sig Dispense Refill    miscellaneous medical supply [facility] Donut cushion 1 Each 0    naproxen (NAPROSYN) 500 mg tablet Take 1 Tab by mouth two (2) times daily (with meals). 14 Tab 0    [] naproxen (NAPROSYN) 500 mg tablet Take 1 Tab by mouth two (2) times daily (with meals) for 10 days. 20 Tab 0     No current facility-administered medications on file prior to visit. Allergies and Intolerances:   No Known Allergies    Family History:   Family History   Problem Relation Age of Onset    Hypertension Mother        Social History:   She  reports that she has been smoking. She has never used smokeless tobacco. She  reports that she drinks alcohol. Vitals:   Visit Vitals    /62    Pulse (!) 101    Temp 96.6 °F (35.9 °C)    Resp 16    Ht 5' 1.5\" (1.562 m)    Wt 249 lb (112.9 kg)    LMP 2017    BMI 46.29 kg/m2     Body surface area is 2.21 meters squared. Hgb: 4.5    Physical Exam   Constitutional: She is oriented to person, place, and time. She appears well-developed and well-nourished. HENT:   Head: Atraumatic. Cardiovascular: Normal rate.     Pulmonary/Chest: Effort normal and breath sounds normal.   Neurological: She is alert and oriented to person, place, and time. Psychiatric: She has a normal mood and affect. Nursing note and vitals reviewed. ASSESSMENT and PLAN    ICD-10-CM ICD-9-CM    1. Anemia, unspecified type D64.9 285.9 AMB POC HEMOGLOBIN (HGB)      CBC WITH AUTOMATED DIFF   2. Class 3 obesity with serious comorbidity and body mass index (BMI) of 45.0 to 49.9 in adult, unspecified obesity type (HCC) J05.9 167.13 METABOLIC PANEL, COMPREHENSIVE    Z68.42 V85.42 TSH 3RD GENERATION      LIPID PANEL      HEMOGLOBIN A1C WITH EAG   3. Loss of consciousness ([facility] [address] R40.20 780.09 CT HEAD W CONT   4. Fall, initial encounter [address] Jillian G242.2 CT HEAD W CONT   5. H/O gastric bypass Z98.890 V45.86      Follow-up Disposition:  Return in about 1 week (around 2/9/2018), or if symptoms worsen or fail to improve.  lab results and schedule of future lab studies reviewed with patient  reviewed medications and side effects in detail  Pt sent to ED. Discussed patient with Leo skinner RN at [facility] ED.    - Alarm signals discussed. ER precautions  - Plan of care reviewed with patient. Understanding verbalized and they are in agreement with plan of care.

## 2018-02-02 NOTE — Clinical Note
Status[de-identified] Inpatient [101] Type of Bed: Intensive Care [6] Inpatient Hospitalization Certified Necessary for the Following Reasons: 4. Patient requires ICU level of care interventions (further clarification in H&P documentation) Admitting Diagnosis: Anemia [600800] Admitting Physician: Shahzad Flowers [[id number]] Attending Physician: Shahzad Flowers [[id number]] Estimated Length of Stay: 2 Midnights Discharge Plan[de-identified] Home with Office Follow-up

## 2018-02-02 NOTE — IP AVS SNAPSHOT
Kai Dick 
 
 
 [address] [facility] [address] [place] Patient: Sanna Ramsey MRN: [id number] :1985 About your hospitalization You were admitted on:  February 3, 2018 You last received care in the:  [facility] [address] You were discharged on:  2018 Why you were hospitalized Your primary diagnosis was:  Not on File Your diagnoses also included:  Anemia, Microcytic Hypochromic Anemia Follow-up Information Follow up With Details Comments Contact Info Celso Lyman NP   [address] [place] [id number] 
[phone] Your Scheduled Appointments 2018 12:00 PM EST New Patient with Alan Taylor MD  
[facility] INTERNAL MEDICINE OF [facility]) [address] [place] [address] 6 [facility] [address]  
[phone] 2018 10:00 AM EST  
ROUTINE CARE with Celso Lyman NP [facility] Main Office ([facility]) 14 [facility] 1 [facility] [address]  
[phone] Discharge Orders None A check live indicates which time of day the medication should be taken. My Medications START taking these medications Instructions Each Dose to Equal  
 Morning Noon Evening Bedtime  
 docusate sodium 100 mg capsule Commonly known as:  Alfonskevin Thornton Your next dose is:  2018 Take 1 Cap by mouth two (2) times a day. 100 mg  
    
   
   
   
  
 ferrous sulfate 325 mg (65 mg iron) EC tablet Commonly known as:  IRON Your next dose is:  2018 Take 1 Tab by mouth Daily (before breakfast). 325 mg  
    
   
   
   
  
 nicotine 7 mg/24 hr  
Commonly known as:  Shearon Moniteau Your next dose is:  2018 1 Patch by TransDERmal route every twenty-four (24) hours. 1 Patch  OTHER  
   
 Check CBC, CMP, Mg in 3 days, results to PCP immediately, diagnosis- Anemia OTHER Notes to Patient:  10 times an hour Incentive spirometry- use as directed OTHER  
   
 Graded Compression Stockings b/l LE- use as directed OTHER Physical Therapy- Evaluate and Treat OTHER Your next dose is:  02/05/2018 Multivitamin- Patient states she was on a gummy vitamin recommended by her bariatric surgeon and I have advised her to resume that as directed OTHER This is to certify that Chetna Gutierrez was admitted to [facility] on 2/2/18 and discharged on 2/4/18 , and has been advised to take rest at home for 7 ( seven ) more days and then resume work if symptom free. pantoprazole 40 mg tablet Commonly known as:  PROTONIX Your next dose is:  02/05/2018 Take 1 Tab by mouth daily. 40 mg  
    
   
   
   
  
  
STOP taking these medications   
 naproxen 500 mg tablet Commonly known as:  NAPROSYN  
   
  
  
ASK your doctor about these medications Instructions Each Dose to Equal  
 Morning Noon Evening Bedtime  
 miscellaneous medical [facility] Your Medications Information on where to get these meds will be given to you by the nurse or doctor. ! Ask your nurse or doctor about these medications  
  docusate sodium 100 mg capsule  
 ferrous sulfate 325 mg (65 mg iron) EC tablet  
 nicotine 7 mg/24 hr  
 OTHER  
 pantoprazole 40 mg tablet Discharge Instructions DISCHARGE SUMMARY from Nurse PATIENT INSTRUCTIONS: 
 
 
F-face looks uneven A-arms unable to move or move unevenly S-speech slurred or non-existent T-time-call 911 as soon as signs and symptoms begin-DO NOT go Back to bed or wait to see if you get better-TIME IS BRAIN. Warning Signs of HEART ATTACK Call 911 if you have these symptoms: 
? Chest discomfort. Most heart attacks involve discomfort in the center of the chest that lasts more than a few minutes, or that goes away and comes back. It can feel like uncomfortable pressure, squeezing, fullness, or pain. ? Discomfort in other areas of the upper body. Symptoms can include pain or discomfort in one or both arms, the back, neck, jaw, or stomach. ? Shortness of breath with or without chest discomfort. ? Other signs may include breaking out in a cold sweat, nausea, or lightheadedness. Don't wait more than five minutes to call [address]! Fast action can save your life. Calling 911 is almost always the fastest way to get lifesaving treatment. Emergency Medical Services staff can begin treatment when they arrive  up to an hour sooner than if someone gets to the hospital by car. The discharge information has been reviewed with the patient and spouse. The patient and spouse verbalized understanding. Discharge medications reviewed with the patient and spouse and appropriate educational materials and side effects teaching were provided. ___________________________________________________________________________________________________________________________________ Introducing [facility]! Dear Maia Pimentel: Thank you for requesting a [employer] account. Our records indicate that you already have an active [employer] account. You can access your account anytime at [email or web address] Did you know that you can access your hospital and ER discharge instructions at any time in Miewhart? You can also review all of your test results from your hospital stay or ER visit. Additional Information If you have questions, please visit the Frequently Asked Questions section of the [employer] website at [email or web address] Remember, [employer]ict is NOT to be used for urgent needs. For medical emergencies, dial 911. Now available from your iPhone and Android! Unresulted Labs-Please follow up with your PCP about these lab tests Order Current Status CHAD BY MULTIPLEX FLOW IA, QL In process HAPTOGLOBIN In process HEMOGLOBIN FRACTIONATION In process PERIPHERAL SMEAR In process Providers Seen During Your Hospitalization Provider Specialty Primary office phone Samreen Menjivar DO Emergency Medicine [phone] Kirit Back MD Internal Medicine [phone] Cristal Gambino MD Internal Medicine [phone] Your Primary Care Physician (PCP) Primary Care Physician Office Phone Office Fax [facility] [phone] You are allergic to the following No active allergies Recent Documentation Height Weight Breastfeeding? BMI OB Status Smoking Status 1.562 m 113.7 kg No 46.6 kg/m2 Unknown Current Every Day Smoker Emergency Contacts Name Discharge Info Relation Home Work Mobile [address] [facility] CAREGIVER [3] Spouse [3] [phone] Riya Hines 1636  Parent [1] [phone] Patient Belongings The following personal items are in your possession at time of discharge: 
  Dental Appliances: None  Visual Aid: None      Home Medications: None   Jewelry: Ring (4 rings white color, bracelet, necklace)  Clothing: Shirt, Socks, Jacket/Coat    Other Valuables: None Discharge Instructions Attachments/References ANEMIA: IRON DEFICIENCY (ENGLISH) IRON-RICH DIET (ENGLISH) SMOKING: ANTI-SMOKING MEDICATION: DECIDING ABOUT (ENGLISH) PANTOPRAZOLE (BY MOUTH) (ENGLISH) LAXATIVE, STOOL SOFTENERS (BY MOUTH) (ENGLISH) Patient Handouts Iron Deficiency Anemia: Care Instructions Your Care Instructions Anemia means that you do not have enough red blood cells. Red blood cells carry oxygen around your body. When you have anemia, it can make you pale, weak, and tired. Many things can cause anemia. The most common cause is loss of blood. This can happen if you have heavy menstrual periods. It can also happen if you have bleeding in your stomach or bowel. You can also get anemia if you don't have enough iron in your diet or if it's hard for your body to absorb iron. In some cases, pregnancy causes anemia. That's because a pregnant woman needs more iron. Your doctor may do more tests to find the cause of your anemia. If a disease or other health problem is causing it, your doctor will treat that problem. It's important to follow up with your doctor to make sure that your iron level returns to normal. 
Follow-up care is a key part of your treatment and safety. Be sure to make and go to all appointments, and call your doctor if you are having problems. It's also a good idea to know your test results and keep a list of the medicines you take. How can you care for yourself at home? · If your doctor recommended iron pills, take them as directed. ¨ Try to take the pills on an empty stomach. You can do this about 1 hour before or 2 hours after meals. But you may need to take iron with food to avoid an upset stomach. ¨ Do not take antacids or drink milk or anything with caffeine within 2 hours of when you take your iron. They can keep your body from absorbing the iron well. ¨ Vitamin C helps your body absorb iron.  You may want to take iron pills with a glass of orange juice or some other food high in vitamin C. 
 ¨ Iron pills may cause stomach problems. These include heartburn, nausea, diarrhea, constipation, and cramps. It can help to drink plenty of fluids and include fruits, vegetables, and fiber in your diet. ¨ It's normal for iron pills to make your stool a greenish or grayish black. But internal bleeding can also cause dark stool. So it's important to tell your doctor about any color changes. ¨ Call your doctor if you think you are having a problem with your iron pills. Even after you start to feel better, it will take several months for your body to build up its supply of iron. ¨ If you miss a pill, don't take a double dose. ¨ Keep iron pills out of the reach of small children. Too much iron can be very dangerous. · Eat foods with a lot of iron. These include red meat, shellfish, poultry, and eggs. They also include beans, raisins, whole-grain bread, and leafy green vegetables. · Steam your vegetables. This is the best way to prepare them if you want to get as much iron as possible. · Be safe with medicines. Do not take nonsteroidal anti-inflammatory pain relievers unless your doctor tells you to. These include aspirin, naproxen (Aleve), and ibuprofen (Advil, Motrin). · Liquid iron can stain your teeth. But you can mix it with water or juice and drink it with a straw. Then it won't get on your teeth. When should you call for help? Call 911 anytime you think you may need emergency care. For example, call if: 
? · You passed out (lost consciousness). ?Call your doctor now or seek immediate medical care if: 
? · You are short of breath. ? · You are dizzy or light-headed, or you feel like you may faint. ? · You have new or worse bleeding. ? Watch closely for changes in your health, and be sure to contact your doctor if: 
? · You feel weaker or more tired than usual.  
? · You do not get better as expected. Where can you learn more? Go to http://yaa-luciana.info/. Enter Z616 in the search box to learn more about \"Iron Deficiency Anemia: Care Instructions. \" Current as of: October 13, 2016 Content Version: 11.4 © 3286-1668 Car reviews. Care instructions adapted under license by [employer] (which disclaims liability or warranty for this information). If you have questions about a medical condition or this instruction, always ask your healthcare professional. [address] any warranty or liability for your use of this information. Iron-Rich Diet: Care Instructions Your Care Instructions Your body needs iron to make hemoglobin. Hemoglobin is a substance in red blood cells that carries oxygen from the lungs to cells all through your body. If you do not get enough iron, your body makes fewer and smaller red blood cells. As a result, your body's cells may not get enough oxygen. Adult men need 8 milligrams of iron a day; adult women need 18 milligrams of iron a day. After menopause, women need 8 milligrams of iron a day. A pregnant woman needs 27 milligrams of iron a day. Infants and young children have higher iron needs relative to their size than other age groups. People who have lost blood because of ulcers or heavy menstrual periods may become very low in iron and may develop anemia. Most people can get the iron their bodies need by eating enough of certain iron-rich foods. Your doctor may recommend that you take an iron supplement along with eating an iron-rich diet. Follow-up care is a key part of your treatment and safety. Be sure to make and go to all appointments, and call your doctor if you are having problems. It's also a good idea to know your test results and keep a list of the medicines you take. How can you care for yourself at home? · Make iron-rich foods a part of your daily diet. Iron-rich foods include: ¨ All meats, such as chicken, beef, lamb, pork, fish, and shellfish.  Liver is especially high in iron. ¨ Leafy green vegetables. ¨ Raisins, peas, beans, lentils, barley, and eggs. ¨ Iron-fortified breakfast cereals. · Eat foods with vitamin C along with iron-rich foods. Vitamin C helps you absorb more iron from food. Drink a glass of orange juice or another citrus juice with your food. · Eat meat and vegetables or grains together. The iron in meat helps your body absorb the iron in other foods. Where can you learn more? Go to http://yaa-luciana.info/. Enter 0328 [id number] in the search box to learn more about \"Iron-Rich Diet: Care Instructions. \" Current as of: May 12, 2017 Content Version: 11.4 © 7588-0270 locr. Care instructions adapted under license by [employer] (which disclaims liability or warranty for this information). If you have questions about a medical condition or this instruction, always ask your healthcare professional. [place] [address] any warranty or liability for your use of this information. Deciding About Using Medicines To Quit Smoking Deciding About Using Medicines To Quit Smoking What are the medicines you can use? Your doctor may prescribe varenicline (Chantix) or bupropion (Zyban). These medicines can help you cope with cravings for tobacco. They are pills that don't contain nicotine. You also can use nicotine replacement products. These do contain nicotine. There are many types. · Gum and lozenges slowly release nicotine into your mouth. · Patches stick to your skin. They slowly release nicotine into your bloodstream. 
· An inhaler has a gayle that contains nicotine. You breathe in a puff of nicotine vapor through your mouth and throat. · Nasal spray releases a mist that contains nicotine. What are key points about this decision? · Using medicines can double your chances of quitting smoking. They can ease cravings and withdrawal symptoms. · Getting counseling along with using medicine can raise your chances of quitting even more. · If you smoke fewer than 5 cigarettes a day, you may not need medicines to help you quit smoking. · These medicines have less nicotine than cigarettes. And by itself, nicotine is not nearly as harmful as smoking. The tars, carbon monoxide, and other toxic chemicals in tobacco cause the harmful effects. · The side effects of nicotine replacement products depend on the type of product. For example, a patch can make your skin red and itchy. Medicines in pill form can make you sick to your stomach. They can also cause dry mouth and trouble sleeping. For most people, the side effects are not bad enough to make them stop using the products. Why might you choose to use medicines to quit smoking? · You have tried on your own to stop smoking, but you were not able to stop. · You smoke more than 5 cigarettes a day. · You want to increase your chances of quitting smoking. · You want to reduce your cravings and withdrawal symptoms. · You feel the benefits of medicine outweigh the side effects. Why might you choose not to use medicine? · You want to try quitting on your own by stopping all at once (\"cold turkey\"). · You want to cut back slowly on the number of cigarettes you smoke. · You smoke fewer than 5 cigarettes a day. · You do not like using medicine. · You feel the side effects of medicines outweigh the benefits. · You are worried about the cost of medicines. Your decision Thinking about the facts and your feelings can help you make a decision that is right for you. Be sure you understand the benefits and risks of your options, and think about what else you need to do before you make the decision. Where can you learn more? Go to http://yaa-luciana.info/. Enter G940 in the search box to learn more about \"Deciding About Using Medicines To Quit Smoking. \" Current as of: March 20, 2017 Content Version: 11.4 © 5493-3630 [employer]. Care instructions adapted under license by [employer] (which disclaims liability or warranty for this information). If you have questions about a medical condition or this instruction, always ask your healthcare professional. [address] any warranty or liability for your use of this information. Pantoprazole (By mouth) Pantoprazole (pan-TOE-pra-zole) Treats gastroesophageal reflux disease (GERD), a damaged esophagus, and high levels of stomach acid. This medicine is a proton pump inhibitor (PPI). Brand Name(s): Protonix There may be other brand names for this medicine. When This Medicine Should Not Be Used: This medicine is not right for everyone. Do not use it if you had an allergic reaction to pantoprazole or similar medicines. How to Use This Medicine:  
Packet, Tablet, Delayed Release Tablet, Long Acting Tablet · Your doctor will tell you how much medicine to use. Do not use more than directed. Take the medicine at least 30 minutes before a meal. 
· Delayed-release tablet: Swallow the tablet whole. Do not crush, break, or chew it. · Delayed-release packet: ¨ To prepare with applesauce: § Mix the packet contents with 1 teaspoon of applesauce. Do not mix with water, or other liquids or food. Do not divide the packet contents to make smaller doses. § Swallow the mixture within 10 minutes after you mix it. Do not chew or crush the granules. § Sip some water after you take the mixture to make sure you swallow all of the medicine. ¨ To prepare with apple juice: § Mix the packet contents with 1 teaspoon of apple juice in a small cup. Do not divide the packet contents to make smaller doses. § Stir for 5 seconds and drink the mixture immediately. Do not chew or crush the granules. § To make sure you get all of the medicine, add more apple juice to the cup. Drink it immediately. ¨ To prepare for a feeding tube: § Pour the packet contents in a 2-ounce (60 milliliter [mL]) catheter-tip syringe. § Add 10 mL of apple juice to the syringe. Add the mixture to the tube. Gently tap or shake the barrel of the syringe to help empty it. § Add 10 mL of apple juice to the syringe and put it in the tube. Do this at least 2 times. There should be no granules left in the syringe. · This medicine should come with a Medication Guide. Ask your pharmacist for a copy if you do not have one. · Missed dose: Take a dose as soon as you remember. If it is almost time for your next dose, wait until then and take a regular dose. Do not take extra medicine to make up for a missed dose. · Store the medicine in a closed container at room temperature, away from heat, moisture, and direct light. Drugs and Foods to Avoid: Ask your doctor or pharmacist before using any other medicine, including over-the-counter medicines, vitamins, and herbal products. · Some foods and medicines can affect how pantoprazole works. Tell your doctor if you are using any of the following: ¨ Ampicillin, atazanavir, digoxin, erlotinib, ketoconazole, methotrexate, mycophenolate mofetil, nelfinavir ¨ Blood thinner (including warfarin) ¨ Diuretic (water pill) ¨ Iron supplements Warnings While Using This Medicine: · Tell your doctor if you are pregnant or breastfeeding, or if you have liver disease, lupus, or osteoporosis. · This medicine may cause the following problems: ¨ Kidney problems ¨ Low vitamin B12 or magnesium levels ¨ Increased risk of broken bones in the hip, wrist, or spine · This medicine can cause diarrhea. Call your doctor if the diarrhea becomes severe, does not stop, or is bloody. Do not take any medicine to stop diarrhea until you have talked to your doctor. Diarrhea can occur 2 months or more after you stop taking this medicine.  
· Tell any doctor or dentist who treats you that you are using this medicine. This medicine may affect certain medical test results. · Your doctor will check your progress and the effects of this medicine at regular visits. Keep all appointments. · Keep all medicine out of the reach of children. Never share your medicine with anyone. Possible Side Effects While Using This Medicine:  
Call your doctor right away if you notice any of these side effects: · Allergic reaction: Itching or hives, swelling in your face or hands, swelling or tingling in your mouth or throat, chest tightness, trouble breathing · Blistering, peeling, red skin rash · Fever, joint pain, swelling in your body, unusual weight gain, change in how much or how often you urinate · Joint pain, rash on your cheeks or arms that gets worse in the sun · Seizures, dizziness, uneven heartbeat, muscle cramps or twitching · Severe diarrhea, stomach cramps, fever · Stomach pain, nausea, vomiting, weight loss If you notice other side effects that you think are caused by this medicine, tell your doctor. Call your doctor for medical advice about side effects. You may report side effects to FDA at 5-224-FDA-6914 © 2017 [address] Information is for End User's use only and may not be sold, redistributed or otherwise used for commercial purposes. The above information is an  only. It is not intended as medical advice for individual conditions or treatments. Talk to your doctor, nurse or pharmacist before following any medical regimen to see if it is safe and effective for you. Laxative, Stool Softeners (By mouth) Treats constipation by helping you have a bowel movement. Brand Name(s): Col-Rite, Colace, Colace Clear, DSS, Diocto, Diocto Liquid, Doc-Q-Lace, Docuprene, Docusil, Dok, Dulcolax, Fleet Sof-Lax, [facility] Docusate Calcium, [facility] Stool Softener, [facility] Stool Softener Extra Strength There may be other brand names for this medicine. When This Medicine Should Not Be Used: You should not use this medicine if you have severe stomach pain, nausea, or vomiting. Stool softeners should not be used if you have severe stomach pain and do not know the cause. How to Use This Medicine:  
Capsule, Tablet, Liquid, Liquid Filled Capsule · Your doctor will tell you how much medicine to use. Do not use more than directed. · Follow the instructions on the medicine label if you are using this medicine without a prescription. · Drink 6 to 8 glasses of water daily while using any laxative. · To make the oral liquid taste better, you may mix it with one-half glass of milk or fruit juice. · Measure the oral liquid medicine with a marked measuring spoon, oral syringe, medicine cup, or medicine dropper. If a dose is missed: · Use the missed dose as soon as possible. · If you do not remember the missed dose until the next day, skip the missed dose and go back to your regular dosing schedule. · You should not use two doses at the same time. How to Store and Dispose of This Medicine: · Store the medicine in a tightly closed container at room temperature, away from heat and moisture. Do not store liquid-filled capsules in the refrigerator. · Keep all medicine out of the reach of children. Drugs and Foods to Avoid: Ask your doctor or pharmacist before using any other medicine, including over-the-counter medicines, vitamins, and herbal products. · You should not use mineral oil while you are using a stool softener. · You should not use a stool softener within 2 hours before or after taking any other medicines. Laxatives can keep other medicines from working correctly. Warnings While Using This Medicine: · If you are pregnant or breastfeeding, talk to your doctor before taking this medicine. · Do not give laxatives to children under 10years old unless you talk to your doctor. · You should not use this laxative for longer than 1 week unless approved by your doctor. Laxatives may be habit-forming and can harm your bowels if you use them too long. · Stool softeners usually work in 1 to 2 days, but for some people, results can take as long as 3 to 5 days. Possible Side Effects While Using This Medicine: If you notice these less serious side effects, talk with your doctor: · Nausea · Sore throat · Skin rash If you notice other side effects that you think are caused by this medicine, tell your doctor. Call your doctor for medical advice about side effects. You may report side effects to FDA at 5-790-FDA-6097 © 2017 [address] Information is for End User's use only and may not be sold, redistributed or otherwise used for commercial purposes. The above information is an  only. It is not intended as medical advice for individual conditions or treatments. Talk to your doctor, nurse or pharmacist before following any medical regimen to see if it is safe and effective for you. Please provide this summary of care documentation to your next provider. Signatures-by signing, you are acknowledging that this After Visit Summary has been reviewed with you and you have received a copy. Patient Signature:  ____________________________________________________________ Date:  ____________________________________________________________  
  
sAhley Barger Provider Signature:  ____________________________________________________________ Date:  ____________________________________________________________

## 2018-02-03 LAB
ALBUMIN SERPL-MCNC: 3.1 G/DL (ref 3.4–5)
ALBUMIN/GLOB SERPL: 0.7 {RATIO} (ref 0.8–1.7)
ALP SERPL-CCNC: 76 U/L (ref 45–117)
ALT SERPL-CCNC: 16 U/L (ref 13–56)
ANION GAP SERPL CALC-SCNC: 6 MMOL/L (ref 3–18)
AST SERPL-CCNC: 16 U/L (ref 15–37)
BASOPHILS # BLD: 0 K/UL (ref 0–0.1)
BASOPHILS NFR BLD: 0 % (ref 0–2)
BILIRUB SERPL-MCNC: 0.9 MG/DL (ref 0.2–1)
BUN SERPL-MCNC: 8 MG/DL (ref 7–18)
BUN/CREAT SERPL: 13 (ref 12–20)
CA-I SERPL-SCNC: 1.2 MMOL/L (ref 1.12–1.32)
CALCIUM SERPL-MCNC: 8.1 MG/DL (ref 8.5–10.1)
CHLORIDE SERPL-SCNC: 107 MMOL/L (ref 100–108)
CO2 SERPL-SCNC: 26 MMOL/L (ref 21–32)
CREAT SERPL-MCNC: 0.63 MG/DL (ref 0.6–1.3)
DAT POLY-SP REAG RBC QL: NORMAL
DIFFERENTIAL METHOD BLD: ABNORMAL
EOSINOPHIL # BLD: 0.4 K/UL (ref 0–0.4)
EOSINOPHIL NFR BLD: 4 % (ref 0–5)
ERYTHROCYTE [DISTWIDTH] IN BLOOD BY AUTOMATED COUNT: 32.8 % (ref 11.6–14.5)
EST. AVERAGE GLUCOSE BLD GHB EST-MCNC: ABNORMAL MG/DL
FERRITIN SERPL-MCNC: 3 NG/ML (ref 8–388)
GLOBULIN SER CALC-MCNC: 4.3 G/DL (ref 2–4)
GLUCOSE SERPL-MCNC: 83 MG/DL (ref 74–99)
HBA1C MFR BLD: <3.5 % (ref 4.2–5.6)
HCT VFR BLD AUTO: 18.6 % (ref 35–45)
HCT VFR BLD AUTO: 21.4 % (ref 35–45)
HGB BLD-MCNC: 5.1 G/DL (ref 12–16)
HGB BLD-MCNC: 6 G/DL (ref 12–16)
HGB BLD-MCNC: 6.9 G/DL (ref 12–16)
INR PPP: 1.2 (ref 0.8–1.2)
IRON SATN MFR SERPL: 5 %
IRON SERPL-MCNC: 27 UG/DL (ref 50–175)
LDH SERPL L TO P-CCNC: 157 U/L (ref 81–234)
LYMPHOCYTES # BLD: 2.9 K/UL (ref 0.9–3.6)
LYMPHOCYTES NFR BLD: 29 % (ref 21–52)
MCH RBC QN AUTO: 18.3 PG (ref 24–34)
MCHC RBC AUTO-ENTMCNC: 28 G/DL (ref 31–37)
MCV RBC AUTO: 65.4 FL (ref 74–97)
MONOCYTES # BLD: 0.8 K/UL (ref 0.05–1.2)
MONOCYTES NFR BLD: 8 % (ref 3–10)
NEUTS SEG # BLD: 5.9 K/UL (ref 1.8–8)
NEUTS SEG NFR BLD: 59 % (ref 40–73)
PHOSPHATE SERPL-MCNC: 3.4 MG/DL (ref 2.5–4.9)
PLATELET # BLD AUTO: 481 K/UL (ref 135–420)
POTASSIUM SERPL-SCNC: 4.5 MMOL/L (ref 3.5–5.5)
PROT SERPL-MCNC: 7.4 G/DL (ref 6.4–8.2)
PROTHROMBIN TIME: 14.4 SEC (ref 11.5–15.2)
RBC # BLD AUTO: 3.27 M/UL (ref 4.2–5.3)
SODIUM SERPL-SCNC: 139 MMOL/L (ref 136–145)
TIBC SERPL-MCNC: 511 UG/DL (ref 250–450)
TSH SERPL DL<=0.05 MIU/L-ACNC: 2.91 UIU/ML (ref 0.36–3.74)
WBC # BLD AUTO: 10.1 K/UL (ref 4.6–13.2)

## 2018-02-03 PROCEDURE — 99218 HC RM OBSERVATION: CPT

## 2018-02-03 PROCEDURE — 65660000000 HC RM CCU STEPDOWN

## 2018-02-03 PROCEDURE — 74011250636 HC RX REV CODE- 250/636: Performed by: INTERNAL MEDICINE

## 2018-02-03 PROCEDURE — 85610 PROTHROMBIN TIME: CPT | Performed by: INTERNAL MEDICINE

## 2018-02-03 PROCEDURE — 86880 COOMBS TEST DIRECT: CPT | Performed by: PHYSICIAN ASSISTANT

## 2018-02-03 PROCEDURE — 83540 ASSAY OF IRON: CPT | Performed by: INTERNAL MEDICINE

## 2018-02-03 PROCEDURE — 36430 TRANSFUSION BLD/BLD COMPNT: CPT

## 2018-02-03 PROCEDURE — 82728 ASSAY OF FERRITIN: CPT | Performed by: INTERNAL MEDICINE

## 2018-02-03 PROCEDURE — 82330 ASSAY OF CALCIUM: CPT | Performed by: PHYSICIAN ASSISTANT

## 2018-02-03 PROCEDURE — P9016 RBC LEUKOCYTES REDUCED: HCPCS

## 2018-02-03 PROCEDURE — 83036 HEMOGLOBIN GLYCOSYLATED A1C: CPT | Performed by: PHYSICIAN ASSISTANT

## 2018-02-03 PROCEDURE — 84100 ASSAY OF PHOSPHORUS: CPT | Performed by: PHYSICIAN ASSISTANT

## 2018-02-03 PROCEDURE — 85025 COMPLETE CBC W/AUTO DIFF WBC: CPT | Performed by: PHYSICIAN ASSISTANT

## 2018-02-03 PROCEDURE — 36415 COLL VENOUS BLD VENIPUNCTURE: CPT | Performed by: INTERNAL MEDICINE

## 2018-02-03 PROCEDURE — 74011250637 HC RX REV CODE- 250/637: Performed by: PHYSICIAN ASSISTANT

## 2018-02-03 PROCEDURE — 86038 ANTINUCLEAR ANTIBODIES: CPT | Performed by: PHYSICIAN ASSISTANT

## 2018-02-03 PROCEDURE — 30233N1 TRANSFUSION OF NONAUTOLOGOUS RED BLOOD CELLS INTO PERIPHERAL VEIN, PERCUTANEOUS APPROACH: ICD-10-PCS | Performed by: EMERGENCY MEDICINE

## 2018-02-03 PROCEDURE — 84443 ASSAY THYROID STIM HORMONE: CPT | Performed by: PHYSICIAN ASSISTANT

## 2018-02-03 PROCEDURE — 74011250636 HC RX REV CODE- 250/636: Performed by: EMERGENCY MEDICINE

## 2018-02-03 PROCEDURE — 74011000258 HC RX REV CODE- 258: Performed by: EMERGENCY MEDICINE

## 2018-02-03 PROCEDURE — 80053 COMPREHEN METABOLIC PANEL: CPT | Performed by: PHYSICIAN ASSISTANT

## 2018-02-03 PROCEDURE — C9113 INJ PANTOPRAZOLE SODIUM, VIA: HCPCS | Performed by: INTERNAL MEDICINE

## 2018-02-03 PROCEDURE — 85014 HEMATOCRIT: CPT | Performed by: PHYSICIAN ASSISTANT

## 2018-02-03 RX ORDER — SODIUM CHLORIDE 9 MG/ML
250 INJECTION, SOLUTION INTRAVENOUS AS NEEDED
Status: DISCONTINUED | OUTPATIENT
Start: 2018-02-03 | End: 2018-02-04 | Stop reason: HOSPADM

## 2018-02-03 RX ORDER — PANTOPRAZOLE SODIUM 40 MG/10ML
40 INJECTION, POWDER, LYOPHILIZED, FOR SOLUTION INTRAVENOUS EVERY 24 HOURS
Status: DISCONTINUED | OUTPATIENT
Start: 2018-02-03 | End: 2018-02-04 | Stop reason: HOSPADM

## 2018-02-03 RX ORDER — CYANOCOBALAMIN 1000 UG/ML
1000 INJECTION, SOLUTION INTRAMUSCULAR; SUBCUTANEOUS ONCE
Status: COMPLETED | OUTPATIENT
Start: 2018-02-03 | End: 2018-02-03

## 2018-02-03 RX ORDER — ACETAMINOPHEN 325 MG/1
650 TABLET ORAL
Status: DISCONTINUED | OUTPATIENT
Start: 2018-02-03 | End: 2018-02-04 | Stop reason: HOSPADM

## 2018-02-03 RX ORDER — PANTOPRAZOLE SODIUM 40 MG/1
40 TABLET, DELAYED RELEASE ORAL EVERY 24 HOURS
Status: DISCONTINUED | OUTPATIENT
Start: 2018-02-03 | End: 2018-02-03

## 2018-02-03 RX ADMIN — Medication 10 ML: at 14:00

## 2018-02-03 RX ADMIN — ACETAMINOPHEN 650 MG: 325 TABLET, FILM COATED ORAL at 06:50

## 2018-02-03 RX ADMIN — PANTOPRAZOLE SODIUM 40 MG: 40 INJECTION, POWDER, FOR SOLUTION INTRAVENOUS at 06:51

## 2018-02-03 RX ADMIN — ACETAMINOPHEN 650 MG: 325 TABLET, FILM COATED ORAL at 20:35

## 2018-02-03 RX ADMIN — IRON SUCROSE 200 MG: 20 INJECTION, SOLUTION INTRAVENOUS at 18:59

## 2018-02-03 RX ADMIN — Medication 10 ML: at 20:36

## 2018-02-03 RX ADMIN — CYANOCOBALAMIN 1000 MCG: 1000 INJECTION, SOLUTION INTRAMUSCULAR at 18:15

## 2018-02-03 NOTE — ED NOTES
Bedside shift change report given to Abdullahi Wan RN  (oncoming nurse) by Rodrigo Knight RN (offgoing nurse). Report included the following information SBAR, Kardex, ED Summary, Intake/Output, MAR and Recent Results.

## 2018-02-03 NOTE — H&P
Hospitalist Admission Note    NAME: Raffy Monae   :  1985   MRN:  [id number]     Date/Time of admission:  2018 9:26 PM    Patient PCP: Ms. Juhi Pierre, NP  ________________________________________________________________________    My assessment of this patient's clinical condition and my plan of care is as follows. Assessment / Plan:  1. Acute severe microcytic anemia   2. H/o iron deficiency  3. H/o gastric bypass, noncompliant with f/u and vitamin administration  4. H/o asthma, compensated  5. Irregular menstrual cycle - last cycle reportedly in 2017  6. Frequent NSAID administration    1. Admit to ICU d/t how low h/h is and hospital protocol  2. Transfuse 3 units now  3. Check ferritin, iron panel, peripheral smear  4. IV venofer for at least 2 doses prior to discharge  5. Recommend better compliance with f/u and vitamin administration  6. May need outpt hematology f/u  7. Heme negative from stool, however, may benefit from GI eval as outpt for elective EGD to evaluate anastomosis. 8. In ED, CHAD and direct juan ordered. 9. Check coags    Code Status: full  Surrogate Decision Maker: patient    DVT Prophylaxis: scd's  GI Prophylaxis: not indicated          Subjective:   CHIEF COMPLAINT: fatigue and headache    HISTORY OF PRESENT ILLNESS:     Abel Lenz is a 28 y.o.  female who presents with persistent and progressive fatigue and headache. Pt has h/o gbp surgery and noncompliance with vitamins. Pt has known iron deficiency anemia and had an iron infusion 6-7 years ago. Has not followed up and now noted to have severe microcytic anemia. Pt was heme negative from below, but does endorse taking NSAIDS as well since she fell on some ice recently. Not taking every day. Denies abd pain. ED starting transfusion and is reportedly reaching out to [facility] for ICU admission d/t level of anemia. We were asked to admit for work up and evaluation of the above problems. Past Medical History:   Diagnosis Date    Anemia NEC     Asthma      delivery     Gallstones     Gastrointestinal disorder         Past Surgical History:   Procedure Laterality Date    HX  SECTION      HX GASTRIC BYPASS  2007    HX ORTHOPAEDIC      correction of bow legs       Social History   Substance Use Topics    Smoking status: Current Every Day Smoker    Smokeless tobacco: Never Used      Comment: cigar 2 a day    Alcohol use Yes      Comment: occational   liquor drinker        Family History   Problem Relation Age of Onset    Hypertension Mother      No Known Allergies     Prior to Admission medications    Medication Sig Start Date End Date Taking? Authorizing Provider   naproxen (NAPROSYN) 500 mg tablet Take 1 Tab by mouth two (2) times daily (with meals) for 10 days. 18  Arlet Hoskins PA-C   miscellaneous medical supply mis Donut cushion 18   Arlet Hoskins PA-C   naproxen (NAPROSYN) 500 mg tablet Take 1 Tab by mouth two (2) times daily (with meals). 4/1/15   Naveen Camargo MD       REVIEW OF SYSTEMS:     I am not able to complete the review of systems because:    The patient is intubated and sedated    The patient has altered mental status due to his acute medical problems    The patient has baseline aphasia from prior stroke(s)    The patient has baseline dementia and is not reliable historian    The patient is in acute medical distress and unable to provide information           Total of 12 systems reviewed as follows:       POSITIVE= bolded text  Negative = text not underlined  General:  fever, chills, sweats, generalized weakness, weight loss/gain,      loss of appetite   Eyes:    blurred vision, eye pain, loss of vision, double vision  ENT:    rhinorrhea, pharyngitis   Respiratory:   cough, sputum production, SOB, RIVERA, wheezing, pleuritic pain   Cardiology:   chest pain, palpitations, orthopnea, PND, edema, syncope   Gastrointestinal:  abdominal pain , N/V, diarrhea, dysphagia, constipation, bleeding   Genitourinary:  frequency, urgency, dysuria, hematuria, incontinence   Muskuloskeletal :  arthralgia, myalgia, back pain  Hematology:  easy bruising, nose or gum bleeding, lymphadenopathy   Dermatological: rash, ulceration, pruritis, color change / jaundice  Endocrine:   hot flashes or polydipsia   Neurological:  headache, dizziness, confusion, focal weakness, paresthesia,     Speech difficulties, memory loss, gait difficulty  Psychological: Feelings of anxiety, depression, agitation    Objective:   VITALS:    Visit Vitals    /83    Pulse 95    Temp 96.9 °F (36.1 °C)    Resp 14    SpO2 100%       PHYSICAL EXAM:    General:    Alert, cooperative, no distress, appears stated age. HEENT: Atraumatic, anicteric sclerae, pink conjunctivae     No oral ulcers, mucosa moist, throat clear, dentition poor  Neck:  Supple, symmetrical,  thyroid: non tender  Lungs:   Clear to auscultation bilaterally. No Wheezing or Rhonchi. No rales. Chest wall:  No tenderness  No Accessory muscle use. Heart:   Regular  rhythm,  No  murmur   No edema  Abdomen:   Soft, non-tender. Not distended. Bowel sounds normal  Extremities: No cyanosis. No clubbing,      Skin turgor normal, Capillary refill normal, Radial dial pulse 2+  Skin:     Not pale. Not Jaundiced  No rashes   Psych:  Good insight. Not depressed. Not anxious or agitated. Neurologic: EOMs intact. No facial asymmetry. No aphasia or slurred speech. Symmetrical strength, Sensation grossly intact.  Alert and oriented X 4.     _______________________________________________________________________  Care Plan discussed with:    Comments   Patient x    Family  x    RN     Care Manager                    Consultant:      _______________________________________________________________________  Expected  Disposition:   Home with Family x   HH/PT/OT/RN    SNF/LTC    [facility] ________________________________________________________________________  TOTAL TIME:  54 Minutes    Critical Care Provided     Minutes non procedure based      Comments    x Reviewed previous records   >50% of visit spent in counseling and coordination of care x Discussion with patient and/or family and questions answered       ________________________________________________________________________      Procedures: see electronic medical records for all procedures/Xrays and details which were not copied into this note but were reviewed prior to creation of Plan. LAB DATA REVIEWED:    Recent Results (from the past 24 hour(s))   AMB POC HEMOGLOBIN (HGB)    Collection Time: 02/02/18  4:40 PM   Result Value Ref Range    Hemoglobin (POC) 4.5    METABOLIC PANEL, COMPREHENSIVE    Collection Time: 02/02/18  6:00 PM   Result Value Ref Range    Sodium 139 136 - 145 mmol/L    Potassium 3.7 3.5 - 5.5 mmol/L    Chloride 107 100 - 108 mmol/L    CO2 26 21 - 32 mmol/L    Anion gap 6 3.0 - 18 mmol/L    Glucose 78 74 - 99 mg/dL    BUN 10 7.0 - 18 MG/DL    Creatinine 0.67 0.6 - 1.3 MG/DL    BUN/Creatinine ratio 15 12 - 20      GFR est AA >60 >60 ml/min/1.73m2    GFR est non-AA >60 >60 ml/min/1.73m2    Calcium 8.8 8.5 - 10.1 MG/DL    Bilirubin, total 0.5 0.2 - 1.0 MG/DL    ALT (SGPT) 18 13 - 56 U/L    AST (SGOT) 18 15 - 37 U/L    Alk.  phosphatase 84 45 - 117 U/L    Protein, total 8.4 (H) 6.4 - 8.2 g/dL    Albumin 3.6 3.4 - 5.0 g/dL    Globulin 4.8 (H) 2.0 - 4.0 g/dL    A-G Ratio 0.8 0.8 - 1.7     TYPE & SCREEN    Collection Time: 02/02/18  6:00 PM   Result Value Ref Range    Crossmatch Expiration 02/05/2018     ABO/Rh(D) AB POSITIVE     Antibody screen NEG     CALLED TO: ASHLEY MAGANA, AT [address] [id number] BY ELA     Unit number [id number]     Blood component type [facility]     Unit division 00     Status of unit ALLOCATED     Crossmatch result Compatible     Unit number [id number]     Blood component type [facility]     Unit division 00 Status of unit ALLOCATED     Crossmatch result Compatible     Unit number [id number]     Blood component type RC LR     Unit division 00     Status of unit ISSUED     Crossmatch result Compatible    RETICULOCYTE COUNT    Collection Time: 02/02/18  6:00 PM   Result Value Ref Range    Reticulocyte count 0.4 (L) 0.5 - 2.3 %   BILIRUBIN, DIRECT    Collection Time: 02/02/18  6:00 PM   Result Value Ref Range    Bilirubin, direct 0.1 0.0 - 0.2 MG/DL   IRON PROFILE    Collection Time: 02/02/18  6:00 PM   Result Value Ref Range    Iron 13 (L) 50 - 175 ug/dL    TIBC 571 (H) 250 - 450 ug/dL    Iron % saturation 2 %   VITAMIN B12 & FOLATE    Collection Time: 02/02/18  6:00 PM   Result Value Ref Range    Vitamin B12 359 211 - 911 pg/mL    Folate 10.5 3.10 - 17.50 ng/mL   CBC WITH AUTOMATED DIFF    Collection Time: 02/02/18  6:06 PM   Result Value Ref Range    WBC 9.7 4.6 - 13.2 K/uL    RBC 2.92 (L) 4.20 - 5.30 M/uL    HGB 4.4 (LL) 12.0 - 16.0 g/dL    HCT 16.9 (LL) 35.0 - 45.0 %    MCV 57.9 (L) 74.0 - 97.0 FL    MCH 15.1 (L) 24.0 - 34.0 PG    MCHC 26.0 (L) 31.0 - 37.0 g/dL    RDW 29.4 (H) 11.6 - 14.5 %    PLATELET 452 (H) 077 - 420 K/uL    NEUTROPHILS 59 40 - 73 %    LYMPHOCYTES 31 21 - 52 %    MONOCYTES 6 3 - 10 %    EOSINOPHILS 4 0 - 5 %    BASOPHILS 0 0 - 2 %    ABS. NEUTROPHILS 5.7 1.8 - 8.0 K/UL    ABS. LYMPHOCYTES 3.0 0.9 - 3.6 K/UL    ABS. MONOCYTES 0.6 0.05 - 1.2 K/UL    ABS. EOSINOPHILS 0.4 0.0 - 0.4 K/UL    ABS.  BASOPHILS 0.0 0.0 - 0.06 K/UL    DF AUTOMATED      PLATELET COMMENTS Increased Platelets      RBC COMMENTS ANISOCYTOSIS  2+        RBC COMMENTS HYPOCHROMIA  3+        RBC COMMENTS SCHISTOCYTES  1+       HCG QL SERUM    Collection Time: 02/02/18  6:06 PM   Result Value Ref Range    HCG, Ql. NEGATIVE  NEG         Jaqueline Singer MD  Internal Medicine  Hospitalist Division

## 2018-02-03 NOTE — PROGRESS NOTES
conducted an initial consultation and Spiritual Assessment for HARDIK Trevino, who is a 28 y.o.,female. Patients Primary Language is: [place]. According to the patients EMR Mandaeism Affiliation is: [facility].     The reason the Patient came to the hospital is:   Patient Active Problem List    Diagnosis Date Noted    H/O gastric bypass 02/02/2018    Anemia 02/02/2018    Microcytic hypochromic anemia 02/02/2018        The  provided the following Interventions:  Initiated a relationship of care and support. Explored issues of jay, spirituality and/or Oriental orthodox needs while hospitalized. Listened empathically. Provided chaplaincy education. Provided information about Spiritual Care Services. Offered prayer and assurance of continued prayers on patient's behalf. Chart reviewed. The following outcomes were achieved:  Patient shared some information about their medical narrative and spiritual journey/beliefs. Patient processed feeling about current hospitalization. Patient expressed gratitude for the 's visit. Assessment:  Patient did not indicate any spiritual or Oriental orthodox issues which require Spiritual Care Services interventions at this time. Patient does not have any Oriental orthodox/cultural needs that will affect patients preferences in health care. Plan:  Chaplains will continue to follow and will provide pastoral care on an as needed or requested basis.  recommends bedside caregivers page  on duty if patient shows signs of acute spiritual or emotional distress.     [address], [address] [facility]

## 2018-02-03 NOTE — ED NOTES
Report received from [facility], [facility].

## 2018-02-03 NOTE — ED NOTES
Bleeding observed around IV site. Pressure dressing applied. Pt remains alert and oriented and able to ambulate to restroom with steady gait noted.

## 2018-02-03 NOTE — ED NOTES
Bedside shift change report given to Malu Parker RN (oncoming nurse) by Chey Yu RN (offgoing nurse). Report included the following information SBAR, ED Summary, MAR and Recent Results.

## 2018-02-03 NOTE — PROGRESS NOTES
[facility] Hospitalist Group  Progress Note    Patient: Claire Ramos Age: 28 y.o. : 1985 MR#: [id number] SSN: [id number]  Date: 2/3/2018     Subjective:     Patient sitting in bed in NAD, awake, alert. Mother at bedside    Assessment/Plan:     1- Severe SHILA  2- h/o Gastric bypass  3- Tobacco abuse  4- Noncomplaince    PLAN  Hb 6.9. Transfuse 1 more unit PRBCs today  Iv venofer  OP f/u with Hematology  PPI, OP f/u with GI  Smoking cessation education  Counseled compliance  PT, OT  Case discussed with:  []Patient  []Family  []Nursing  []Case Management  DVT Prophylaxis:  []Lovenox  []Hep SQ  []SCDs  []Coumadin   []On Heparin gtt    Objective:   VS:   Visit Vitals    /73 (BP 1 Location: Right arm, BP Patient Position: Sitting)    Pulse 80    Temp 98.1 °F (36.7 °C)    Resp 18    SpO2 100%    Breastfeeding No      Tmax/24hrs: Temp (24hrs), Av.9 °F (36.6 °C), Min:96.6 °F (35.9 °C), Max:98.6 °F (37 °C)  No intake or output data in the 24 hours ending 18 1454    General:  Awake, alert  Cardiovascular:  S1S2+, RRR  Pulmonary:  CTA b/l  GI:  Soft, BS+, NT, ND  Extremities:  No edema  Moves all 4 extremities    Labs:    Recent Results (from the past 24 hour(s))   AMB POC HEMOGLOBIN (HGB)    Collection Time: 18  4:40 PM   Result Value Ref Range    Hemoglobin (POC) 4.5    METABOLIC PANEL, COMPREHENSIVE    Collection Time: 18  6:00 PM   Result Value Ref Range    Sodium 139 136 - 145 mmol/L    Potassium 3.7 3.5 - 5.5 mmol/L    Chloride 107 100 - 108 mmol/L    CO2 26 21 - 32 mmol/L    Anion gap 6 3.0 - 18 mmol/L    Glucose 78 74 - 99 mg/dL    BUN 10 7.0 - 18 MG/DL    Creatinine 0.67 0.6 - 1.3 MG/DL    BUN/Creatinine ratio 15 12 - 20      GFR est AA >60 >60 ml/min/1.73m2    GFR est non-AA >60 >60 ml/min/1.73m2    Calcium 8.8 8.5 - 10.1 MG/DL    Bilirubin, total 0.5 0.2 - 1.0 MG/DL    ALT (SGPT) 18 13 - 56 U/L    AST (SGOT) 18 15 - 37 U/L    Alk.  phosphatase 84 45 - 117 U/L    Protein, total 8.4 (H) 6.4 - 8.2 g/dL    Albumin 3.6 3.4 - 5.0 g/dL    Globulin 4.8 (H) 2.0 - 4.0 g/dL    A-G Ratio 0.8 0.8 - 1.7     TYPE & SCREEN    Collection Time: 02/02/18  6:00 PM   Result Value Ref Range    Crossmatch Expiration 02/05/2018     ABO/Rh(D) AB POSITIVE     Antibody screen NEG     CALLED TO: ASHLEY MAGANA AT 1951 ON [id number] BY CLB     CALLED TO: ASHLEY MAGANA ON [id number] AT 0700 BY BENNIE     Unit number [id number]     Blood component type RC LR     Unit division 00     Status of unit ISSUED     Crossmatch result Compatible     Unit number [id number]     Blood component type RC LR     Unit division 00     Status of unit ISSUED     Crossmatch result Compatible     Unit number [id number]     Blood component type RC LR     Unit division 00     Status of unit TRANSFUSED     Crossmatch result Compatible     Unit number [id number]     Blood component type RC LR     Unit division 00     Status of unit ISSUED     Crossmatch result Compatible    LD    Collection Time: 02/02/18  6:00 PM   Result Value Ref Range     81 - 234 U/L   RETICULOCYTE COUNT    Collection Time: 02/02/18  6:00 PM   Result Value Ref Range    Reticulocyte count 0.4 (L) 0.5 - 2.3 %   BILIRUBIN, DIRECT    Collection Time: 02/02/18  6:00 PM   Result Value Ref Range    Bilirubin, direct 0.1 0.0 - 0.2 MG/DL   IRON PROFILE    Collection Time: 02/02/18  6:00 PM   Result Value Ref Range    Iron 13 (L) 50 - 175 ug/dL    TIBC 571 (H) 250 - 450 ug/dL    Iron % saturation 2 %   VITAMIN B12 & FOLATE    Collection Time: 02/02/18  6:00 PM   Result Value Ref Range    Vitamin B12 359 211 - 911 pg/mL    Folate 10.5 3.10 - 17.50 ng/mL   CBC WITH AUTOMATED DIFF    Collection Time: 02/02/18  6:06 PM   Result Value Ref Range    WBC 9.7 4.6 - 13.2 K/uL    RBC 2.92 (L) 4.20 - 5.30 M/uL    HGB 4.4 (LL) 12.0 - 16.0 g/dL    HCT 16.9 (LL) 35.0 - 45.0 %    MCV 57.9 (L) 74.0 - 97.0 FL    MCH 15.1 (L) 24.0 - 34.0 PG    MCHC 26.0 (L) 31.0 - 37.0 g/dL    RDW 29.4 (H) 11.6 - 14.5 %    PLATELET 698 (H) 829 - 420 K/uL    NEUTROPHILS 59 40 - 73 %    LYMPHOCYTES 31 21 - 52 %    MONOCYTES 6 3 - 10 %    EOSINOPHILS 4 0 - 5 %    BASOPHILS 0 0 - 2 %    ABS. NEUTROPHILS 5.7 1.8 - 8.0 K/UL    ABS. LYMPHOCYTES 3.0 0.9 - 3.6 K/UL    ABS. MONOCYTES 0.6 0.05 - 1.2 K/UL    ABS. EOSINOPHILS 0.4 0.0 - 0.4 K/UL    ABS. BASOPHILS 0.0 0.0 - 0.06 K/UL    DF AUTOMATED      PLATELET COMMENTS Increased Platelets      RBC COMMENTS ANISOCYTOSIS  2+        RBC COMMENTS HYPOCHROMIA  3+        RBC COMMENTS SCHISTOCYTES  1+       HCG QL SERUM    Collection Time: 02/02/18  6:06 PM   Result Value Ref Range    HCG, Ql. NEGATIVE  NEG     METABOLIC PANEL, COMPREHENSIVE    Collection Time: 02/02/18  9:19 PM   Result Value Ref Range    Sodium 141 136 - 145 mmol/L    Potassium 4.1 3.5 - 5.5 mmol/L    Chloride 107 100 - 108 mmol/L    CO2 26 21 - 32 mmol/L    Anion gap 8 3.0 - 18 mmol/L    Glucose 82 74 - 99 mg/dL    BUN 10 7.0 - 18 MG/DL    Creatinine 0.65 0.6 - 1.3 MG/DL    BUN/Creatinine ratio 15 12 - 20      GFR est AA >60 >60 ml/min/1.73m2    GFR est non-AA >60 >60 ml/min/1.73m2    Calcium 8.7 8.5 - 10.1 MG/DL    Bilirubin, total 0.5 0.2 - 1.0 MG/DL    ALT (SGPT) 17 13 - 56 U/L    AST (SGOT) 26 15 - 37 U/L    Alk. phosphatase 83 45 - 117 U/L    Protein, total 7.6 6.4 - 8.2 g/dL    Albumin 3.5 3.4 - 5.0 g/dL    Globulin 4.1 (H) 2.0 - 4.0 g/dL    A-G Ratio 0.9 0.8 - 1.7     MAGNESIUM    Collection Time: 02/02/18  9:19 PM   Result Value Ref Range    Magnesium 2.3 1.6 - 2.6 mg/dL   HGB & HCT    Collection Time: 02/03/18  1:45 AM   Result Value Ref Range    HGB 5.1 (LL) 12.0 - 16.0 g/dL    HCT 18.6 (L) 35.0 - 45.0 %   CALCIUM, IONIZED    Collection Time: 02/03/18  5:31 AM   Result Value Ref Range    Ionized Calcium 1.20 1. 12 - 0.79 MMOL/L   METABOLIC PANEL, COMPREHENSIVE    Collection Time: 02/03/18  5:31 AM   Result Value Ref Range    Sodium 139 136 - 145 mmol/L    Potassium 4.5 3.5 - 5.5 mmol/L Chloride 107 100 - 108 mmol/L    CO2 26 21 - 32 mmol/L    Anion gap 6 3.0 - 18 mmol/L    Glucose 83 74 - 99 mg/dL    BUN 8 7.0 - 18 MG/DL    Creatinine 0.63 0.6 - 1.3 MG/DL    BUN/Creatinine ratio 13 12 - 20      GFR est AA >60 >60 ml/min/1.73m2    GFR est non-AA >60 >60 ml/min/1.73m2    Calcium 8.1 (L) 8.5 - 10.1 MG/DL    Bilirubin, total 0.9 0.2 - 1.0 MG/DL    ALT (SGPT) 16 13 - 56 U/L    AST (SGOT) 16 15 - 37 U/L    Alk. phosphatase 76 45 - 117 U/L    Protein, total 7.4 6.4 - 8.2 g/dL    Albumin 3.1 (L) 3.4 - 5.0 g/dL    Globulin 4.3 (H) 2.0 - 4.0 g/dL    A-G Ratio 0.7 (L) 0.8 - 1.7     CBC WITH AUTOMATED DIFF    Collection Time: 02/03/18  5:31 AM   Result Value Ref Range    WBC 10.1 4.6 - 13.2 K/uL    RBC 3.27 (L) 4.20 - 5.30 M/uL    HGB 6.0 (L) 12.0 - 16.0 g/dL    HCT 21.4 (L) 35.0 - 45.0 %    MCV 65.4 (L) 74.0 - 97.0 FL    MCH 18.3 (L) 24.0 - 34.0 PG    MCHC 28.0 (L) 31.0 - 37.0 g/dL    RDW 32.8 (H) 11.6 - 14.5 %    PLATELET 163 (H) 212 - 420 K/uL    NEUTROPHILS 59 40 - 73 %    LYMPHOCYTES 29 21 - 52 %    MONOCYTES 8 3 - 10 %    EOSINOPHILS 4 0 - 5 %    BASOPHILS 0 0 - 2 %    ABS. NEUTROPHILS 5.9 1.8 - 8.0 K/UL    ABS. LYMPHOCYTES 2.9 0.9 - 3.6 K/UL    ABS. MONOCYTES 0.8 0.05 - 1.2 K/UL    ABS. EOSINOPHILS 0.4 0.0 - 0.4 K/UL    ABS.  BASOPHILS 0.0 0.0 - 0.1 K/UL    DF AUTOMATED     PHOSPHORUS    Collection Time: 02/03/18  5:31 AM   Result Value Ref Range    Phosphorus 3.4 2.5 - 4.9 MG/DL   TSH 3RD GENERATION    Collection Time: 02/03/18  5:31 AM   Result Value Ref Range    TSH 2.91 0.36 - 3.74 uIU/mL   HEMOGLOBIN A1C WITH EAG    Collection Time: 02/03/18  5:31 AM   Result Value Ref Range    Hemoglobin A1c <3.5 (L) 4.2 - 5.6 %    Est. average glucose Cannot be calculated mg/dL   DIRECT [place]    Collection Time: 02/03/18  5:31 AM   Result Value Ref Range    KEVYN Poly NEG    HEMOGLOBIN    Collection Time: 02/03/18 10:18 AM   Result Value Ref Range    HGB 6.9 (L) 12.0 - 16.0 g/dL       Signed By: Shanice Rendon, MD     February 3, 2018 2:54 PM

## 2018-02-03 NOTE — CONSULTS
[facility]  Pulmonary, Critical Care, and Sleep Medicine      Name: Sanna Ramsey MRN: [id number]   : 1985 Hospital: [address] [place] [address]   Date: 2018          Critical Care Initial Patient Consult    Requesting MD: Scarlett Neves                                                  Reason for CC Consult: Severe Anemia  Subjective/History:   Patient is a 28 y.o. female with a PMH of anemia, asthma, s/p gastric bypass surgery ~12 years ago, and asthma being consulted by the ICU for severe anemia with a hemoglobin of 4.3. Patient presents from [facility] where she was seen for headaches, fainting spells, numbness/tingling in the lower extremities. Patient was sent to ED for low hemoglobin. During exam, patient complains of fainting spells, headaches, dizziness, SOB off and on for a year. States she has gone from 280lbs in April to 244lbs. States her appetite has always been poor but seems worse lately. Denies fevers/night sweats. Denies blood in urine, stool, or vomiting blood. No nose bleeds or blood in gums. She has irregular menstrual cycles and her last cycle was in December which she describes as some spotting at the beginning of the month. Occasionally she will have fist sized blood clots during her menstrual cycle but otherwise typically uses at most a regular tampon Q4 hours. Does not take any multivitamins for her history of gastric bypass and has not followed up with surgeon in a while. Also complains of abdominal pain off and on for a few years and has had a normal ultrasound in the past (). Recently abdominal pain has increased- at least a few days out of every month- and the pain moves up into her esophagus. Does not see any correlation of the pain with eating or activity. Has taken zantac in the past without any relief. States she has been worked up in the past for this and no etiology was found. Denies n/v.  Also complains of shooting pain under the left breast that is random and also not associated with activity. Patient's numbness and tingling in her LE began after a fall 1 month ago on the ice. Denies loss of sensation in the feet/legs. Patient also complains of diffuse back pain throughout her back that comes and goes for a few months now and particularly lower back pain with muscle spasms that began after her fall on the ice. Reported family history is significant for mother with HTN and paternal grandfather with gastric cancer. No family history of anemia. Past Medical History:   Diagnosis Date    Anemia NEC     Asthma      delivery     Gallstones     Gastrointestinal disorder       Past Surgical History:   Procedure Laterality Date    HX  SECTION      HX GASTRIC BYPASS      HX ORTHOPAEDIC      correction of bow legs      Prior to Admission medications    Medication Sig Start Date End Date Taking? Authorizing Provider   naproxen (NAPROSYN) 500 mg tablet Take 1 Tab by mouth two (2) times daily (with meals) for 10 days. 18  Erin Ricardo PA-C   miscellaneous medical supply [facility] cushion 18   Erin Ricardo PA-C   naproxen (NAPROSYN) 500 mg tablet Take 1 Tab by mouth two (2) times daily (with meals). 4/1/15   Emy Asher MD     Current Facility-Administered Medications   Medication Dose Route Frequency    sodium chloride (NS) flush 5-10 mL  5-10 mL IntraVENous Q8H     No Known Allergies   Social History   Substance Use Topics    Smoking status: Current Every Day Smoker    Smokeless tobacco: Never Used      Comment: cigar 2 a day    Alcohol use Yes      Comment: occational   liquor drinker      Family History   Problem Relation Age of Onset    Hypertension Mother         Review of Systems:  A comprehensive review of systems was negative except for that written in the HPI.     Objective:   Vital Signs:    Visit Vitals    /86    Pulse 78    Temp 98.1 °F (36.7 °C)    Resp 15    SpO2 100%       O2 Device: Room air       Temp (24hrs), Av.2 °F (36.2 °C), Min:96.6 °F (35.9 °C), Max:98.1 °F (36.7 °C)       Intake/Output:   Last shift:         Last 3 shifts:    No intake or output data in the 24 hours ending 185    Physical Exam:    General:  Alert, cooperative, no distress, appears stated age. Head:  Normocephalic, without obvious abnormality, atraumatic. Eyes:  Pale conjunctivae. PERRL, EOMs intact. Nose: Nares normal. Septum midline. Mucosa normal.    Throat: Lips, mucosa, and tongue normal. Teeth and gums normal. Pale lips. Neck: Supple, symmetrical, trachea midline, no adenopathy, thyroid: no enlargment/tenderness/nodules. Lungs:   Clear to auscultation bilaterally. Occasional wheeze. Heart:  Regular rate and rhythm, S1, S2 normal, no murmur, click, rub or gallop. Abdomen:   Soft, mild tenderness in epigastric region. No guarding or rigidity. Obese. Bowel sounds normal. No masses,  No organomegaly. Extremities: Extremities normal, atraumatic, no cyanosis or edema. Pulses: 2+ and symmetric all extremities. Skin: Skin color, texture, turgor normal. No rashes or lesions   Neurologic: Grossly nonfocal. Sensation to crude touch intact.         Data:     Recent Results (from the past 24 hour(s))   AMB POC HEMOGLOBIN (HGB)    Collection Time: 18  4:40 PM   Result Value Ref Range    Hemoglobin (POC) 4.5    METABOLIC PANEL, COMPREHENSIVE    Collection Time: 18  6:00 PM   Result Value Ref Range    Sodium 139 136 - 145 mmol/L    Potassium 3.7 3.5 - 5.5 mmol/L    Chloride 107 100 - 108 mmol/L    CO2 26 21 - 32 mmol/L    Anion gap 6 3.0 - 18 mmol/L    Glucose 78 74 - 99 mg/dL    BUN 10 7.0 - 18 MG/DL    Creatinine 0.67 0.6 - 1.3 MG/DL    BUN/Creatinine ratio 15 12 - 20      GFR est AA >60 >60 ml/min/1.73m2    GFR est non-AA >60 >60 ml/min/1.73m2    Calcium 8.8 8.5 - 10.1 MG/DL    Bilirubin, total 0.5 0.2 - 1.0 MG/DL    ALT (SGPT) 18 13 - 56 U/L    AST (SGOT) 18 15 - 37 U/L    Alk. phosphatase 84 45 - 117 U/L    Protein, total 8.4 (H) 6.4 - 8.2 g/dL    Albumin 3.6 3.4 - 5.0 g/dL    Globulin 4.8 (H) 2.0 - 4.0 g/dL    A-G Ratio 0.8 0.8 - 1.7     TYPE & SCREEN    Collection Time: 02/02/18  6:00 PM   Result Value Ref Range    Crossmatch Expiration 02/05/2018     ABO/Rh(D) AB POSITIVE     Antibody screen NEG     CALLED TO: IZZY, ASHLEY, AT [address] [employer] [id number] BY CLB     Unit number [id number]     Blood component type [facility]     Unit division 00     Status of unit ALLOCATED     Crossmatch result Compatible     Unit number [id number]     Blood component type  LR     Unit division 00     Status of unit ALLOCATED     Crossmatch result Compatible     Unit number [id number]     Blood component type [facility]     Unit division 00     Status of unit ISSUED     Crossmatch result Compatible    RETICULOCYTE COUNT    Collection Time: 02/02/18  6:00 PM   Result Value Ref Range    Reticulocyte count 0.4 (L) 0.5 - 2.3 %   BILIRUBIN, DIRECT    Collection Time: 02/02/18  6:00 PM   Result Value Ref Range    Bilirubin, direct 0.1 0.0 - 0.2 MG/DL   IRON PROFILE    Collection Time: 02/02/18  6:00 PM   Result Value Ref Range    Iron 13 (L) 50 - 175 ug/dL    TIBC 571 (H) 250 - 450 ug/dL    Iron % saturation 2 %   VITAMIN B12 & FOLATE    Collection Time: 02/02/18  6:00 PM   Result Value Ref Range    Vitamin B12 359 211 - 911 pg/mL    Folate 10.5 3.10 - 17.50 ng/mL   CBC WITH AUTOMATED DIFF    Collection Time: 02/02/18  6:06 PM   Result Value Ref Range    WBC 9.7 4.6 - 13.2 K/uL    RBC 2.92 (L) 4.20 - 5.30 M/uL    HGB 4.4 (LL) 12.0 - 16.0 g/dL    HCT 16.9 (LL) 35.0 - 45.0 %    MCV 57.9 (L) 74.0 - 97.0 FL    MCH 15.1 (L) 24.0 - 34.0 PG    MCHC 26.0 (L) 31.0 - 37.0 g/dL    RDW 29.4 (H) 11.6 - 14.5 %    PLATELET 116 (H) 273 - 420 K/uL    NEUTROPHILS 59 40 - 73 %    LYMPHOCYTES 31 21 - 52 %    MONOCYTES 6 3 - 10 %    EOSINOPHILS 4 0 - 5 %    BASOPHILS 0 0 - 2 %    ABS. NEUTROPHILS 5.7 1.8 - 8.0 K/UL    ABS. LYMPHOCYTES 3.0 0.9 - 3.6 K/UL    ABS. MONOCYTES 0.6 0.05 - 1.2 K/UL    ABS. EOSINOPHILS 0.4 0.0 - 0.4 K/UL    ABS. BASOPHILS 0.0 0.0 - 0.06 K/UL    DF AUTOMATED      PLATELET COMMENTS Increased Platelets      RBC COMMENTS ANISOCYTOSIS  2+        RBC COMMENTS HYPOCHROMIA  3+        RBC COMMENTS SCHISTOCYTES  1+       HCG QL SERUM    Collection Time: 02/02/18  6:06 PM   Result Value Ref Range    HCG, Ql. NEGATIVE  NEG               Telemetry:tachycardia    Imaging:  I have personally reviewed the patients radiographs and have reviewed the reports:  None. Reviewed past imaging. No significant findings. IMPRESSION:   · Microcytic, hypochromic anemia; H/H 4.4/16.9, schistocytes on the smear, low retic of 0.4, Iron low, normal B12 and folate levels. No bilirubin in urine. DDX iron deficiency anemia vs. thalassemia vs. hemolytic anemia vs. malignancy  · Thrombocytosis- platelets 255- could be due to iron deficiency anemia  · Abdominal pain-epigastric/ esophageal, ongoing for some time. · Recent weight loss (31 lbs since April)  · Hx gastric bypass surgery ~12 years ago  · Hx Asthma  · Obesity   · Full Code      RECOMMENDATIONS:   Resp - May use NC if needed for pO2 level >92%. Monitor for SOB during/after transfusion. PRN albuterol for wheezing. ID - Monitor for s/so infection. WBC normal. Afebrile. CVS - May use lasix if patient appears overloaded following transfusion. Monitor hemodynamics. Baseline EKG pending. Heme/Onc- 3 Units PRBCs ordered. F/U H/H. Transfuse to Hgb >=7. Monitor for active signs of active bleeding. F/U iron panel, LDH, Haptoglobin, Coomb's panel, CHAD, and electrophoresis. Occult stool pending. Monitor for transfusion reaction. Heme/onc consult in am.   Metabolic - Ionized Calcium following transfusion. Daily CMP. Mag and Phos in am. Replace lytes per protocol. Renal - Trend renal indices. Strict Is/Os. Endocrine - TSH pending. A1C pending. ACHS accuchecks. Can add SSI if needed. Neuro/ Pain/ Sedation - tylenol PRN. GI - famotidine for abdominal pain. Consider CT scan abd/pelvis in the setting of anemia, abdominal pain, and weight loss. Diet- normal diet as tolerated by patient.    Prophylaxis - DVT-not indicated 2/2 anemia, GI- famotidine        Total critical care time exclusive of procedures: 60 minutes  Anamaria Baptiste PA-C  02/02/18

## 2018-02-03 NOTE — ED NOTES
TRANSFER - OUT REPORT:    Verbal report given to SHAHRZAD Pruett (name) on Anice Letha  being transferred to  (unit) for routine progression of care       Report consisted of patients Situation, Background, Assessment and   Recommendations(SBAR). Information from the following report(s) SBAR, ED Summary and MAR was reviewed with the receiving nurse. Lines:   Peripheral IV 02/03/18 Right Arm (Active)   Site Assessment Clean, dry, & intact 2/3/2018  6:28 AM   Phlebitis Assessment 0 2/3/2018  6:28 AM   Infiltration Assessment 0 2/3/2018  6:28 AM   Dressing Status Dry 2/3/2018  6:28 AM   Dressing Type Transparent 2/3/2018  6:28 AM   Hub Color/Line Status Pink 2/3/2018  6:28 AM        Opportunity for questions and clarification was provided.       Patient transported with:  Transportation

## 2018-02-04 VITALS
OXYGEN SATURATION: 97 % | DIASTOLIC BLOOD PRESSURE: 60 MMHG | BODY MASS INDEX: 46.14 KG/M2 | WEIGHT: 250.7 LBS | RESPIRATION RATE: 18 BRPM | HEART RATE: 69 BPM | SYSTOLIC BLOOD PRESSURE: 105 MMHG | HEIGHT: 62 IN | TEMPERATURE: 97.8 F

## 2018-02-04 LAB
ABO + RH BLD: NORMAL
BASOPHILS # BLD: 0 K/UL (ref 0–0.1)
BASOPHILS NFR BLD: 0 % (ref 0–2)
BLD PROD TYP BPU: NORMAL
BLOOD GROUP ANTIBODIES SERPL: NORMAL
BPU ID: NORMAL
CALLED TO:,BCALL1: NORMAL
CALLED TO:,BCALL2: NORMAL
CROSSMATCH RESULT,%XM: NORMAL
DIFFERENTIAL METHOD BLD: ABNORMAL
EOSINOPHIL # BLD: 0.3 K/UL (ref 0–0.4)
EOSINOPHIL NFR BLD: 4 % (ref 0–5)
ERYTHROCYTE [DISTWIDTH] IN BLOOD BY AUTOMATED COUNT: 30.4 % (ref 11.6–14.5)
GLUCOSE BLD STRIP.AUTO-MCNC: 149 MG/DL (ref 70–110)
GLUCOSE BLD STRIP.AUTO-MCNC: 94 MG/DL (ref 70–110)
HAPTOGLOB SERPL-MCNC: 209 MG/DL (ref 34–200)
HCT VFR BLD AUTO: 25.8 % (ref 35–45)
HGB BLD-MCNC: 7.6 G/DL (ref 12–16)
LYMPHOCYTES # BLD: 3 K/UL (ref 0.9–3.6)
LYMPHOCYTES NFR BLD: 35 % (ref 21–52)
MCH RBC QN AUTO: 20.2 PG (ref 24–34)
MCHC RBC AUTO-ENTMCNC: 29.5 G/DL (ref 31–37)
MCV RBC AUTO: 68.4 FL (ref 74–97)
MONOCYTES # BLD: 0.8 K/UL (ref 0.05–1.2)
MONOCYTES NFR BLD: 9 % (ref 3–10)
NEUTS SEG # BLD: 4.5 K/UL (ref 1.8–8)
NEUTS SEG NFR BLD: 52 % (ref 40–73)
PLATELET # BLD AUTO: 417 K/UL (ref 135–420)
PLATELET COMMENTS,PCOM: ABNORMAL
RBC # BLD AUTO: 3.77 M/UL (ref 4.2–5.3)
RBC MORPH BLD: ABNORMAL
SPECIMEN EXP DATE BLD: NORMAL
STATUS OF UNIT,%ST: NORMAL
UNIT DIVISION, %UDIV: 0
WBC # BLD AUTO: 8.6 K/UL (ref 4.6–13.2)

## 2018-02-04 PROCEDURE — 82962 GLUCOSE BLOOD TEST: CPT

## 2018-02-04 PROCEDURE — 97116 GAIT TRAINING THERAPY: CPT

## 2018-02-04 PROCEDURE — C9113 INJ PANTOPRAZOLE SODIUM, VIA: HCPCS | Performed by: INTERNAL MEDICINE

## 2018-02-04 PROCEDURE — 74011000258 HC RX REV CODE- 258: Performed by: EMERGENCY MEDICINE

## 2018-02-04 PROCEDURE — 74011250636 HC RX REV CODE- 250/636: Performed by: EMERGENCY MEDICINE

## 2018-02-04 PROCEDURE — 85025 COMPLETE CBC W/AUTO DIFF WBC: CPT | Performed by: EMERGENCY MEDICINE

## 2018-02-04 PROCEDURE — 97162 PT EVAL MOD COMPLEX 30 MIN: CPT

## 2018-02-04 PROCEDURE — 74011250637 HC RX REV CODE- 250/637: Performed by: INTERNAL MEDICINE

## 2018-02-04 PROCEDURE — 74011250636 HC RX REV CODE- 250/636: Performed by: INTERNAL MEDICINE

## 2018-02-04 RX ORDER — DOCUSATE SODIUM 100 MG/1
100 CAPSULE, LIQUID FILLED ORAL 2 TIMES DAILY
Qty: 60 CAP | Refills: 0 | Status: SHIPPED | OUTPATIENT
Start: 2018-02-04 | End: 2019-02-23

## 2018-02-04 RX ORDER — NICOTINE 7MG/24HR
1 PATCH, TRANSDERMAL 24 HOURS TRANSDERMAL EVERY 24 HOURS
Qty: 10 PATCH | Refills: 0 | Status: SHIPPED | OUTPATIENT
Start: 2018-02-04 | End: 2019-02-23

## 2018-02-04 RX ORDER — FERROUS SULFATE 325(65) MG
325 TABLET, DELAYED RELEASE (ENTERIC COATED) ORAL
Qty: 30 TAB | Refills: 0 | Status: SHIPPED | OUTPATIENT
Start: 2018-02-04 | End: 2019-02-23

## 2018-02-04 RX ORDER — PEDI MULTIVIT 158/IRON/VIT K1 18MG-10MCG
2 TABLET,CHEWABLE ORAL DAILY
Qty: 30 TAB | Refills: 0 | Status: SHIPPED | OUTPATIENT
Start: 2018-02-04 | End: 2018-02-04

## 2018-02-04 RX ORDER — PANTOPRAZOLE SODIUM 40 MG/1
40 TABLET, DELAYED RELEASE ORAL DAILY
Qty: 30 TAB | Refills: 0 | Status: SHIPPED | OUTPATIENT
Start: 2018-02-04 | End: 2019-02-23

## 2018-02-04 RX ADMIN — ACETAMINOPHEN 650 MG: 325 TABLET ORAL at 10:35

## 2018-02-04 RX ADMIN — PANTOPRAZOLE SODIUM 40 MG: 40 INJECTION, POWDER, FOR SOLUTION INTRAVENOUS at 06:10

## 2018-02-04 RX ADMIN — IRON SUCROSE 200 MG: 20 INJECTION, SOLUTION INTRAVENOUS at 15:17

## 2018-02-04 RX ADMIN — Medication 10 ML: at 06:10

## 2018-02-04 RX ADMIN — Medication 10 ML: at 15:20

## 2018-02-04 NOTE — PROGRESS NOTES
Care Management Interventions  PCP Verified by CM: Yes (uses [facility] but unsure of which MD. States has recently switched to [facility])  Mode of Transport at Discharge: Other (see comment) (, Phillip Love, to provide transportation home)  Current Support Network: Lives with Spouse  Confirm Follow Up Transport: Self  Freedom of Choice Offered: Yes  Discharge Location  Discharge Placement: Home with outpatient services      Mallika Momin - [phone]    Discussed outpatient PT with physical therapist, Brayden Wong. to determine is pt is sufficient for [facility] PT.  Physical therapist believes pt would benefit for outpatient PT.  Brayden Wong instructs that pt will need hard copy RX to take to OP PT. Dr Prabha Young informed. Discussed OP PT with pt and . Address and phone number of [facility] on [address] given as they had stated that [facility] was close to them. Also given addresses and phones numbers of facilities on [address] [employer] and Sanju Amato  for ease of scheduling.

## 2018-02-04 NOTE — PROGRESS NOTES
Patient sitting in bed in NAD, awake, alert, follows commands.  at bedside. Discussed discharge plans with patient. Counseled patient regarding quitting smoking. Counseled patient regarding compliance with medications and regular f/u with her doctors. Counseled patient regarding avoiding NSAIDS ( naprosyn, advil, aspirin, motrin, ibuprofen, aleve, indocin, indomethacin, bc powder ). Patient verbalized understanding to above. Patient states she does not drink alcohol. Patient requests prescription for nicotine patch. Patient sttaes she has f/u appointment with her PCP tomorrow. Home today. D/w RN. I Visited patient room twice for above.

## 2018-02-04 NOTE — PROGRESS NOTES
Problem: Mobility Impaired (Adult and Pediatric)  Goal: *Acute Goals and Plan of Care (Insert Text)  Acute goals not established. Patient reports/demonstrates independent functional mobility, acute skilled PT services not indicated at this time. physical Therapy EVALUATION and Discharge    Patient: Chacorta Lyn (77 y.o. female)  Date: 2018  Primary Diagnosis: Anemia  Microcytic hypochromic anemia  Anemia  Precautions: Fall    ASSESSMENT AND RECOMMENDATIONS:  Based on the objective data described below, the patient presents with baseline functional mobility including bed mobility, transfers, ambulation, and general activity tolerance following admission for anemia. Patient presents today semi-reclined in bed, alert and agreeable to PT evaluation. She transferred to standing with supervision where she demonstrated decreased weight-bearing on RLE which she states is due to R sided lowback pain, indicating R pelvis and sacrum. She ambulated in hallway without assistive device, completed 2 stairs with supervision for safety, then ambulated back to room. Patient states she has had chronic low back/pelvic pain, may benefit from imaging and/or additional rehab in outpatient setting for pain management and strengthening. Acute skilled physical therapy is not indicated at this time. Discharge Recommendations: Outpatient  Further Equipment Recommendations for Discharge: N/A      SUBJECTIVE:   Patient stated I've fallen on my butt a lot.     OBJECTIVE DATA SUMMARY:     Past Medical History:   Diagnosis Date    Anemia NEC     Asthma      delivery     Gallstones     Gastrointestinal disorder      Past Surgical History:   Procedure Laterality Date    HX  SECTION      HX GASTRIC BYPASS      HX ORTHOPAEDIC      correction of bow legs     Barriers to Learning/Limitations: None  Compensate with: N/A  Prior Level of Function/Home Situation: Patient lives in single story home, was ambulating independently PTA. Home Situation  Home Environment: Private residence  # Steps to Enter: 1  Wheelchair Ramp: Yes  One/Two Story Residence: One story  Living Alone: No  Support Systems: Family member(s), Spouse/Significant Other/Partner  Patient Expects to be Discharged to[de-identified] Private residence  Current DME Used/Available at Home: None  Critical Behavior:  Neurologic State: Alert  Psychosocial  Patient Behaviors: Calm; Cooperative  Family  Behaviors: Calm;Supportive  Strength:    Strength: Within functional limits (BLE)  Tone & Sensation:   Tone: Normal  Sensation: Intact (BLE to light touch)   Range Of Motion:  AROM: Within functional limits (BLE)  Functional Mobility:  Bed Mobility:  Rolling: Modified independent  Supine to Sit: Modified independent  Scooting: Modified independent  Transfers:  Sit to Stand: Supervision  Stand to Sit: Supervision  Balance:   Sitting: Intact  Standing: Intact; Without support  Ambulation/Gait Training:  Distance (ft): 150 Feet (ft)  Assistive Device:  (None)  Ambulation - Level of Assistance: Supervision  Base of Support: Widened;Shift to left  Speed/Tabitha: Pace decreased (<100 feet/min)  Stairs:  Number of Stairs Trained: 2  Stairs - Level of Assistance: Supervision  Rail Use: Both  Pain:  Patient reports headache, does not quantify at this time  Nurse aware  Activity Tolerance:   Good  Please refer to the flowsheet for vital signs taken during this treatment. After treatment:   [] Patient left in no apparent distress sitting up in chair  [x] Patient left sitting on EOB  [] Patient left in no apparent distress in bed  [] Patient declined to be OOB at this time due to  [x] Call bell left within reach  [x] Nursing notified(Jenny)  [] Caregiver present  [] Bed alarm activated  COMMUNICATION/EDUCATION:   [x]         Fall prevention education was provided and the patient/caregiver indicated understanding.   [x]         Patient/family have participated as able in goal setting and plan of care. [x]         Patient/family agree to work toward stated goals and plan of care. []         Patient understands intent and goals of therapy, but is neutral about his/her participation. []         Patient is unable to participate in goal setting and plan of care. Thank you for this referral.  Fany Riggins   Time Calculation: 23 mins    Mobility  Current  CI= 1-19%   Goal  CI= 1-19%  D/C  CI= 1-19%. The severity rating is based on the Level of Assistance required for Functional Mobility and ADLs.     Eval Complexity: History: MEDIUM  Complexity : 1-2 comorbidities / personal factors will impact the outcome/ POC Exam:MEDIUM Complexity : 3 Standardized tests and measures addressing body structure, function, activity limitation and / or participation in recreation  Presentation: MEDIUM Complexity : Evolving with changing characteristics  Overall Complexity:MEDIUM

## 2018-02-04 NOTE — PROGRESS NOTES
Pt reports \"My head hasn't improved much, I usually take naprosyn for my headaches at home\" Will page MD to inquire about pain mgt options

## 2018-02-04 NOTE — DISCHARGE INSTRUCTIONS
DISCHARGE SUMMARY from Nurse    PATIENT INSTRUCTIONS:    After general anesthesia or intravenous sedation, for 24 hours or while taking prescription Narcotics:  · Limit your activities  · Do not drive and operate hazardous machinery  · Do not make important personal or business decisions  · Do  not drink alcoholic beverages  · If you have not urinated within 8 hours after discharge, please contact your surgeon on call. Report the following to your surgeon:  · Excessive pain, swelling, redness or odor of or around the surgical area  · Temperature over 100.5  · Nausea and vomiting lasting longer than 4 hours or if unable to take medications  · Any signs of decreased circulation or nerve impairment to extremity: change in color, persistent  numbness, tingling, coldness or increase pain  · Any questions    What to do at Home:  Recommended activity: Activity as tolerated    If you experience any of the following symptoms previously occurred, severe lightheaded, dizziness, please follow up with PCP. *  Please give a list of your current medications to your Primary Care Provider. *  Please update this list whenever your medications are discontinued, doses are      changed, or new medications (including over-the-counter products) are added. *  Please carry medication information at all times in case of emergency situations. These are general instructions for a healthy lifestyle:    No smoking/ No tobacco products/ Avoid exposure to second hand smoke  Surgeon General's Warning:  Quitting smoking now greatly reduces serious risk to your health.     Obesity, smoking, and sedentary lifestyle greatly increases your risk for illness    A healthy diet, regular physical exercise & weight monitoring are important for maintaining a healthy lifestyle    You may be retaining fluid if you have a history of heart failure or if you experience any of the following symptoms:  Weight gain of 3 pounds or more overnight or 5 pounds in a week, increased swelling in our hands or feet or shortness of breath while lying flat in bed. Please call your doctor as soon as you notice any of these symptoms; do not wait until your next office visit. Recognize signs and symptoms of STROKE:    F-face looks uneven    A-arms unable to move or move unevenly    S-speech slurred or non-existent    T-time-call 911 as soon as signs and symptoms begin-DO NOT go       Back to bed or wait to see if you get better-TIME IS BRAIN. Warning Signs of HEART ATTACK     Call 911 if you have these symptoms:   Chest discomfort. Most heart attacks involve discomfort in the center of the chest that lasts more than a few minutes, or that goes away and comes back. It can feel like uncomfortable pressure, squeezing, fullness, or pain.  Discomfort in other areas of the upper body. Symptoms can include pain or discomfort in one or both arms, the back, neck, jaw, or stomach.  Shortness of breath with or without chest discomfort.  Other signs may include breaking out in a cold sweat, nausea, or lightheadedness. Don't wait more than five minutes to call 911 - MINUTES MATTER! Fast action can save your life. Calling 911 is almost always the fastest way to get lifesaving treatment. Emergency Medical Services staff can begin treatment when they arrive -- up to an hour sooner than if someone gets to the hospital by car. The discharge information has been reviewed with the patient and spouse. The patient and spouse verbalized understanding. Discharge medications reviewed with the patient and spouse and appropriate educational materials and side effects teaching were provided.   ___________________________________________________________________________________________________________________________________

## 2018-02-04 NOTE — ROUTINE PROCESS
Bedside shift change report given to [address],Jaun 100 (oncoming nurse) by Apolinar Bernal LPN (offgoing nurse).  Report included the following information SBAR, Kardex, Intake/Output, MAR and Recent Results, Cardiac Rhythm SR.

## 2018-02-05 LAB — ANA SER QL: NEGATIVE

## 2018-02-06 ENCOUNTER — OFFICE VISIT (OUTPATIENT)
Dept: FAMILY MEDICINE CLINIC | Facility: CLINIC | Age: 33
End: 2018-02-06

## 2018-02-06 ENCOUNTER — TELEPHONE (OUTPATIENT)
Dept: FAMILY MEDICINE CLINIC | Facility: CLINIC | Age: 33
End: 2018-02-06

## 2018-02-06 VITALS
BODY MASS INDEX: 47.77 KG/M2 | HEART RATE: 64 BPM | OXYGEN SATURATION: 100 % | RESPIRATION RATE: 16 BRPM | TEMPERATURE: 96.8 F | SYSTOLIC BLOOD PRESSURE: 124 MMHG | WEIGHT: 253 LBS | DIASTOLIC BLOOD PRESSURE: 73 MMHG | HEIGHT: 61 IN

## 2018-02-06 DIAGNOSIS — D50.9 MICROCYTIC HYPOCHROMIC ANEMIA: Primary | ICD-10-CM

## 2018-02-06 DIAGNOSIS — G89.29 CHRONIC LOW BACK PAIN WITHOUT SCIATICA, UNSPECIFIED BACK PAIN LATERALITY: ICD-10-CM

## 2018-02-06 DIAGNOSIS — M54.50 CHRONIC LOW BACK PAIN WITHOUT SCIATICA, UNSPECIFIED BACK PAIN LATERALITY: ICD-10-CM

## 2018-02-06 DIAGNOSIS — Z09 HOSPITAL DISCHARGE FOLLOW-UP: ICD-10-CM

## 2018-02-06 DIAGNOSIS — Z98.84 H/O GASTRIC BYPASS: ICD-10-CM

## 2018-02-06 LAB
DEPRECATED HGB OTHER BLD-IMP: 0.7 %
HGB A MFR BLD: 98.4 % (ref 96.4–98.8)
HGB A2 MFR BLD COLUMN CHROM: 0.9 % (ref 1.8–3.2)
HGB C MFR BLD: 0 %
HGB F MFR BLD: 0 % (ref 0–2)
HGB FRACT BLD-IMP: ABNORMAL
HGB S BLD QL SOLY: NEGATIVE
HGB S MFR BLD: 0 %

## 2018-02-06 RX ORDER — TRIAZOLAM 0.25 MG/1
TABLET ORAL
Refills: 0 | COMMUNITY
Start: 2018-01-02 | End: 2018-02-06

## 2018-02-06 RX ORDER — BACLOFEN 10 MG/1
10 TABLET ORAL 3 TIMES DAILY
Qty: 90 TAB | Refills: 1 | Status: SHIPPED | OUTPATIENT
Start: 2018-02-06 | End: 2018-02-06 | Stop reason: SDUPTHER

## 2018-02-06 RX ORDER — BACLOFEN 10 MG/1
10 TABLET ORAL 3 TIMES DAILY
Qty: 90 TAB | Refills: 1 | Status: SHIPPED | OUTPATIENT
Start: 2018-02-06 | End: 2018-02-12 | Stop reason: ALTCHOICE

## 2018-02-06 NOTE — PATIENT INSTRUCTIONS
Learning About How to Have a Healthy Back  What causes back pain? Back pain is often caused by overuse, strain, or injury. For example, people often hurt their backs playing sports or working in the yard, being jolted in a car accident, or lifting something too heavy. Aging plays a part too. Your bones and muscles tend to lose strength as you age, which makes injury more likely. The spongy discs between the bones of the spine (vertebrae) may suffer from wear and tear and no longer provide enough cushion between the bones. A disc that bulges or breaks open (herniated disc) can press on nerves, causing back pain. In some people, back pain is the result of arthritis, broken vertebrae caused by bone loss (osteoporosis), illness, or a spine problem. Although most people have back pain at one time or another, there are steps you can take to make it less likely. How can you have a healthy back? Reduce stress on your back through good posture  Slumping or slouching alone may not cause low back pain. But after the back has been strained or injured, bad posture can make pain worse. · Sleep in a position that maintains your back's normal curves and on a mattress that feels comfortable. Sleep on your side with a pillow between your knees, or sleep on your back with a pillow under your knees. These positions can reduce strain on your back. · Stand and sit up straight. \"Good posture\" generally means your ears, shoulders, and hips are in a straight line. · If you must stand for a long time, put one foot on a stool, ledge, or box. Switch feet every now and then. · Sit in a chair that is low enough to let you place both feet flat on the floor with both knees nearly level with your hips. If your chair or desk is too high, use a footrest to raise your knees. Place a small pillow, a rolled-up towel, or a lumbar roll in the curve of your back if you need extra support.   · Try a kneeling chair, which helps tilt your hips forward. This takes pressure off your lower back. · Try sitting on an exercise ball. It can rock from side to side, which helps keep your back loose. · When driving, keep your knees nearly level with your hips. Sit straight, and drive with both hands on the steering wheel. Your arms should be in a slightly bent position. Reduce stress on your back through careful lifting  · Squat down, bending at the hips and knees only. If you need to, put one knee to the floor and extend your other knee in front of you, bent at a right angle (half kneeling). · Press your chest straight forward. This helps keep your upper back straight while keeping a slight arch in your low back. · Hold the load as close to your body as possible, at the level of your belly button (navel). · Use your feet to change direction, taking small steps. · Lead with your hips as you change direction. Keep your shoulders in line with your hips as you move. · Set down your load carefully, squatting with your knees and hips only. Exercise and stretch your back  · Do some exercise on most days of the week, if your doctor says it is okay. You can walk, run, swim, or cycle. · Stretch your back muscles. Here are a few exercises to try:  Vester Said on your back, and gently pull one bent knee to your chest. Put that foot back on the floor, and then pull the other knee to your chest.  ¨ Do pelvic tilts. Lie on your back with your knees bent. Tighten your stomach muscles. Pull your belly button (navel) in and up toward your ribs. You should feel like your back is pressing to the floor and your hips and pelvis are slightly lifting off the floor. Hold for 6 seconds while breathing smoothly. ¨ Sit with your back flat against a wall. · Keep your core muscles strong. The muscles of your back, belly (abdomen), and buttocks support your spine. ¨ Pull in your belly and imagine pulling your navel toward your spine. Hold this for 6 seconds, then relax.  Remember to keep breathing normally as you tense your muscles. ¨ Do curl-ups. Always do them with your knees bent. Keep your low back on the floor, and curl your shoulders toward your knees using a smooth, slow motion. Keep your arms folded across your chest. If this bothers your neck, try putting your hands behind your neck (not your head), with your elbows spread apart. ¨ Lie on your back with your knees bent and your feet flat on the floor. Tighten your belly muscles, and then push with your feet and raise your buttocks up a few inches. Hold this position 6 seconds as you continue to breathe normally, then lower yourself slowly to the floor. Repeat 8 to 12 times. ¨ If you like group exercise, try Pilates or yoga. These classes have poses that strengthen the core muscles. Lead a healthy lifestyle  · Stay at a healthy weight to avoid strain on your back. · Do not smoke. Smoking increases the risk of osteoporosis, which weakens the spine. If you need help quitting, talk to your doctor about stop-smoking programs and medicines. These can increase your chances of quitting for good. Where can you learn more? Go to http://yaa-luciana.info/. Enter L315 in the search box to learn more about \"Learning About How to Have a Healthy Back. \"  Current as of: March 21, 2017  Content Version: 11.4  © 3380-2949 Healthwise, Incorporated. Care instructions adapted under license by [employer] (which disclaims liability or warranty for this information). If you have questions about a medical condition or this instruction, always ask your healthcare professional. [place] [address] any warranty or liability for your use of this information.

## 2018-02-06 NOTE — MR AVS SNAPSHOT
Glenis Pineda 
 
 
 [address] [facility] [address] [facility] [address] 
[phone] Patient: Carine Ortiz MRN: [id number] :1985 Visit Information Date & Time Provider Department Dept. Phone Encounter #  
 2018  9:15 AM Yossi Abraham NP [facility] [phone] [id number] Follow-up Instructions Return in about 6 weeks (around 3/20/2018), or if symptoms worsen or fail to improve, for anemia. Your Appointments 2018 12:00 PM  
New Patient with MD Lidia De Oliveria Musa [address] [facility]) Appt Note: np Back and thigh pain bring pic id, ins crd, list of med  arrive 30 min early   
 [address] [facility], [facility] [address]  
  
   
 [address] [facility] [address] [facility] [address]  
  
    
 2018 10:00 AM  
ROUTINE CARE with Yossi Abraham NP [facility] Main Office ([facility]) Appt Note: 1 week. follow up. [address] [facility] [address] [facility] [address]  
[phone]  
  
   
 [address] [facility] [address] [facility]  
  
    
 3/6/2018  2:45 PM  
New Patient with MD Michael [address] ([facility]) Appt Note: D/C FROM [facility] 18, SEVERE SHILA, RECORDS IN CC. OSMANY [phone]  
 [facility] [address] [address]  
[phone]  
  
   
 [facility] [address] Upcoming Health Maintenance Date Due Pneumococcal 19-64 Medium Risk (1 of 1 - PPSV23) 2004 DTaP/Tdap/Td series (1 - Tdap) 2006 PAP AKA CERVICAL CYTOLOGY 2006 Influenza Age 5 to Adult 2017 Allergies as of 2018  Review Complete On: 2018 By: Kathy Aguilera No Known Allergies Current Immunizations  Never Reviewed No immunizations on file. Not reviewed this visit You Were Diagnosed With   
  
 Codes Comments Microcytic hypochromic anemia    -  Primary ICD-10-CM: D50.9 ICD-9-CM: 280.9 Hospital discharge follow-up     ICD-10-CM: [facility] ICD-9-CM: V67.59   
 H/O gastric bypass     ICD-10-CM: Z98.890 ICD-9-CM: V45.86 Chronic low back pain without sciatica, unspecified back pain laterality     ICD-10-CM: M54.5, G89.29 ICD-9-CM: 724.2, 338.29 Vitals BP Pulse Temp Resp Height(growth percentile) Weight(growth percentile) 124/73 64 96.8 °F (36 °C) 16 5' 1\" (1.549 m) 253 lb (114.8 kg) SpO2 BMI OB Status Smoking Status 100% 47.8 kg/m2 Unknown Current Every Day Smoker Vitals History BMI and BSA Data Body Mass Index Body Surface Area  
 47.8 kg/m 2 2.22 m 2 Preferred Pharmacy Pharmacy Name Phone [facility] [address] [place] [phone] Your Updated Medication List  
  
   
This list is accurate as of: 2/6/18  9:35 AM.  Always use your most recent med list.  
  
  
  
  
 docusate sodium 100 mg capsule Commonly known as:  Brena Slay Take 1 Cap by mouth two (2) times a day. ferrous sulfate 325 mg (65 mg iron) EC tablet Commonly known as:  IRON Take 1 Tab by mouth Daily (before breakfast). miscellaneous medical supply Misc Donut cushion  
  
 nicotine 7 mg/24 hr  
Commonly known as:  NICODERM CQ  
1 Patch by TransDERmal route every twenty-four (24) hours. OTHER Check CBC, CMP, Mg in 3 days, results to PCP immediately, diagnosis- Anemia OTHER Incentive spirometry- use as directed OTHER  
Graded Compression Stockings b/l LE- use as directed OTHER Physical Therapy- Evaluate and Treat OTHER Multivitamin- Patient states she was on a gummy vitamin recommended by her bariatric surgeon and I have advised her to resume that as directed OTHER This is to certify that Pastor Nye was admitted to [facility] on 2/2/18 and discharged on 2/4/18 , and has been advised to take rest at home for 7 ( seven ) more days and then resume work if symptom free. pantoprazole 40 mg tablet Commonly known as:  PROTONIX Take 1 Tab by mouth daily. We Performed the Following REFERRAL TO GASTROENTEROLOGY [NLP56 Custom] REFERRAL TO HEMATOLOGY [TSX13 Custom] REFERRAL TO PHYSICAL THERAPY [SOE40 Custom] Follow-up Instructions Return in about 6 weeks (around 3/20/2018), or if symptoms worsen or fail to improve, for anemia. To-Do List   
 02/09/2018 8:00 AM  
  Appointment with [facility] CT RM 2 at [facility] RAD [address] ([phone]) DIET RESTRICTIONS  Nothing to eat or drink 4 hours prior to study May have water to take meds  GENERAL INSTRUCTIONS  If you were given medications to take for a contrast allergy prior to having this study, please arrange to have someone drive you to your appointment. If you have had a creatinine level drawn within the past 30 days, please bring most recent results to your appt. This study does not require you to drink contrast prior to your study. MEDICATIONS  Bring a complete list of all medications you are currently taking to include prescriptions, over-the-counter meds, herbals, vitamins & any dietary supplements. OUTSIDE FILMS  Bring outside films, CDs, and reports related to the study with you on the day of your exam.  QUESTIONS  Notify the CT Department if you have any questions concerning your study. Jessica Thomas - [phone] FELIZ Wills - [phone] Referral Information Referral ID Referred By Referred To  
  
 [id number] NIKKI, [address] [address] [address] Evelio grant, [address] Phone: [phone] Fax: [phone] Visits Status Start Date End Date 1 New Request 2/6/18 2/6/19 If your referral has a status of pending review or denied, additional information will be sent to support the outcome of this decision. Referral ID Referred By Referred To  
 [id number] Zuly Mills MD  
   [facility] [address] Gurinder Enamorado  Phone: [phone] Fax: [phone] Visits Status Start Date End Date 1 New Request 2/6/18 2/6/19 If your referral has a status of pending review or denied, additional information will be sent to support the outcome of this decision. Referral ID Referred By Referred To  
 [id number] [facility] [address]   
   [address] [facility] [address] Phone: [phone] Fax: [phone] Visits Status Start Date End Date 1 New Request 2/6/18 2/6/19 If your referral has a status of pending review or denied, additional information will be sent to support the outcome of this decision. Patient Instructions Learning About How to Have a Healthy Back What causes back pain? Back pain is often caused by overuse, strain, or injury. For example, people often hurt their backs playing sports or working in the yard, being jolted in a car accident, or lifting something too heavy. Aging plays a part too. Your bones and muscles tend to lose strength as you age, which makes injury more likely. The spongy discs between the bones of the spine (vertebrae) may suffer from wear and tear and no longer provide enough cushion between the bones. A disc that bulges or breaks open (herniated disc) can press on nerves, causing back pain. In some people, back pain is the result of arthritis, broken vertebrae caused by bone loss (osteoporosis), illness, or a spine problem. Although most people have back pain at one time or another, there are steps you can take to make it less likely. How can you have a healthy back? Reduce stress on your back through good posture Slumping or slouching alone may not cause low back pain. But after the back has been strained or injured, bad posture can make pain worse. · Sleep in a position that maintains your back's normal curves and on a mattress that feels comfortable. Sleep on your side with a pillow between your knees, or sleep on your back with a pillow under your knees. These positions can reduce strain on your back. · Stand and sit up straight. \"Good posture\" generally means your ears, shoulders, and hips are in a straight line. · If you must stand for a long time, put one foot on a stool, ledge, or box. Switch feet every now and then. · Sit in a chair that is low enough to let you place both feet flat on the floor with both knees nearly level with your hips. If your chair or desk is too high, use a footrest to raise your knees. Place a small pillow, a rolled-up towel, or a lumbar roll in the curve of your back if you need extra support. · Try a kneeling chair, which helps tilt your hips forward. This takes pressure off your lower back. · Try sitting on an exercise ball. It can rock from side to side, which helps keep your back loose. · When driving, keep your knees nearly level with your hips. Sit straight, and drive with both hands on the steering wheel. Your arms should be in a slightly bent position. Reduce stress on your back through careful lifting · Squat down, bending at the hips and knees only. If you need to, put one knee to the floor and extend your other knee in front of you, bent at a right angle (half kneeling). · Press your chest straight forward. This helps keep your upper back straight while keeping a slight arch in your low back. · Hold the load as close to your body as possible, at the level of your belly button (navel). · Use your feet to change direction, taking small steps. · Lead with your hips as you change direction. Keep your shoulders in line with your hips as you move. · Set down your load carefully, squatting with your knees and hips only. Exercise and stretch your back · Do some exercise on most days of the week, if your doctor says it is okay. You can walk, run, swim, or cycle. · Stretch your back muscles. Here are a few exercises to try: ¨ Lie on your back, and gently pull one bent knee to your chest. Put that foot back on the floor, and then pull the other knee to your chest. 
¨ Do pelvic tilts. Lie on your back with your knees bent. Tighten your stomach muscles. Pull your belly button (navel) in and up toward your ribs. You should feel like your back is pressing to the floor and your hips and pelvis are slightly lifting off the floor. Hold for 6 seconds while breathing smoothly. ¨ Sit with your back flat against a wall. · Keep your core muscles strong. The muscles of your back, belly (abdomen), and buttocks support your spine. ¨ Pull in your belly and imagine pulling your navel toward your spine. Hold this for 6 seconds, then relax. Remember to keep breathing normally as you tense your muscles. ¨ Do curl-ups. Always do them with your knees bent. Keep your low back on the floor, and curl your shoulders toward your knees using a smooth, slow motion. Keep your arms folded across your chest. If this bothers your neck, try putting your hands behind your neck (not your head), with your elbows spread apart. ¨ Lie on your back with your knees bent and your feet flat on the floor. Tighten your belly muscles, and then push with your feet and raise your buttocks up a few inches. Hold this position 6 seconds as you continue to breathe normally, then lower yourself slowly to the floor. Repeat 8 to 12 times. ¨ If you like group exercise, try Pilates or yoga. These classes have poses that strengthen the core muscles. Lead a healthy lifestyle · Stay at a healthy weight to avoid strain on your back. · Do not smoke.  Smoking increases the risk of osteoporosis, which weakens the spine. If you need help quitting, talk to your doctor about stop-smoking programs and medicines. These can increase your chances of quitting for good. Where can you learn more? Go to http://yaa-luciana.info/. Enter L315 in the search box to learn more about \"Learning About How to Have a Healthy Back. \" Current as of: March 21, 2017 Content Version: 11.4 © 6131-5278 [email or web address].com. Care instructions adapted under license by [employer] (which disclaims liability or warranty for this information). If you have questions about a medical condition or this instruction, always ask your healthcare professional. [address] any warranty or liability for your use of this information. Introducing [facility]! Dear Ladarius Larkin: Thank you for requesting a [employer] account. Our records indicate that you already have an active [employer] account. You can access your account anytime at [email or web address] Did you know that you can access your hospital and ER discharge instructions at any time in [employer]? You can also review all of your test results from your hospital stay or ER visit. Additional Information If you have questions, please visit the Frequently Asked Questions section of the [employer] website at [email or web address] Remember, [employer] is NOT to be used for urgent needs. For medical emergencies, dial 911. Now available from your iPhone and Android! Please provide this summary of care documentation to your next provider. Your primary care clinician is listed as Chano Braga. If you have any questions after today's visit, please call [phone].

## 2018-02-06 NOTE — PROGRESS NOTES
HISTORY OF PRESENT ILLNESS  Cherelle Neely is a 28 y.o. female. HPI Comments: Hospital discharge follow up. Ms Geovanny Colon was seen in the office as a new pt on 18 and after her hgb was found to be 4.5, she was sent to the ED the same day. Her hgb on admission to [facility] was 4.4 and she was transfused 4 units of PRBCs and 2 iron infusions. She was asked to follow up with hematology and GI for further work up. She continues to c/o fatigue and tiredness. HGB at d/c on 18 was 7.6. She has labs ordered for tomorrow 18 to include CBC, CMP and mg. Continues to c/o low back pain which she describes asa vibrating sensation with numbness and tingling of her lower back. Denies any radiation of the pain to her lower extremity. Hospital Follow Up   The history is provided by the patient and spouse. This is a new problem. Associated symptoms include shortness of breath. Anemia   The history is provided by the patient. This is a chronic problem. The current episode started more than 1 week ago. The problem occurs daily. The problem has not changed since onset. Associated symptoms include shortness of breath. Review of Systems   Constitutional: Positive for malaise/fatigue. HENT: Negative. Respiratory: Positive for shortness of breath. Cardiovascular: Negative. Genitourinary: Negative. Musculoskeletal: Positive for back pain. Past Medical History:   Diagnosis Date    Anemia NEC     Asthma      delivery     Gallstones     Gastrointestinal disorder      Past Surgical History:   Procedure Laterality Date    HX  SECTION      HX GASTRIC BYPASS      HX ORTHOPAEDIC      correction of bow legs     Current Outpatient Prescriptions on File Prior to Visit   Medication Sig Dispense Refill    pantoprazole (PROTONIX) 40 mg tablet Take 1 Tab by mouth daily. 30 Tab 0    ferrous sulfate (IRON) 325 mg (65 mg iron) EC tablet Take 1 Tab by mouth Daily (before breakfast).  27 Tab 0    docusate sodium (COLACE) 100 mg capsule Take 1 Cap by mouth two (2) times a day. 60 Cap 0    OTHER Check CBC, CMP, Mg in 3 days, results to PCP immediately, diagnosis- Anemia 1 Each 0    OTHER Incentive spirometry- use as directed 1 Each 0    OTHER Graded Compression Stockings b/l LE- use as directed 1 Each 0    OTHER Physical Therapy- Evaluate and Treat 1 Each 0    OTHER Multivitamin- Patient states she was on a gummy vitamin recommended by her bariatric surgeon and I have advised her to resume that as directed 1 Each 0    nicotine (NICODERM CQ) 7 mg/24 hr 1 Patch by TransDERmal route every twenty-four (24) hours. 10 Patch 0    OTHER This is to certify that Patricio Winston was admitted to [facility] on 2/2/18 and discharged on 2/4/18 , and has been advised to take rest at home for 7 ( seven ) more days and then resume work if symptom free. 1 Each 0    miscellaneous medical supply misc Donut cushion 1 Each 0     No current facility-administered medications on file prior to visit. Allergies and Intolerances:   No Known Allergies    Family History:   Family History   Problem Relation Age of Onset    Hypertension Mother        Social History:   She  reports that she has been smoking. She has never used smokeless tobacco. She  reports that she drinks alcohol. Vitals:   Visit Vitals    /73    Pulse 64    Temp 96.8 °F (36 °C)    Resp 16    Ht 5' 1\" (1.549 m)    Wt 253 lb (114.8 kg)    LMP Comment: last 11/2017    SpO2 100%    BMI 47.8 kg/m2     Body surface area is 2.22 meters squared. Physical Exam   Constitutional: She is oriented to person, place, and time. She appears well-developed and well-nourished. HENT:   Head: Atraumatic. Cardiovascular: Normal rate. Pulmonary/Chest: Effort normal.   Neurological: She is alert and oriented to person, place, and time. Psychiatric: She has a normal mood and affect.  Her behavior is normal.   Nursing note and vitals reviewed. ASSESSMENT and PLAN    ICD-10-CM ICD-9-CM    1. Microcytic hypochromic anemia D50.9 280.9 REFERRAL TO GASTROENTEROLOGY      REFERRAL TO HEMATOLOGY      multivit with min-folic acid (ADULT MULTIVITAMIN GUMMIES) 200 mcg chew      DISCONTINUED: multivit with min-folic acid (ADULT MULTIVITAMIN GUMMIES) 200 mcg chew   2. Hospital discharge follow-up Z09 V67.59    3. H/O gastric bypass Z98.890 V45.86    4. Chronic low back pain without sciatica, unspecified back pain laterality M54.5 724.2 REFERRAL TO PHYSICAL THERAPY    G89.29 338.29 baclofen (LIORESAL) 10 mg tablet      DISCONTINUED: baclofen (LIORESAL) 10 mg tablet   5. Class 3 obesity with serious comorbidity and body mass index (BMI) of 45.0 to 49.9 in adult, unspecified obesity type ([facility] E66.9 278.00     Z68.42 V85.42      Follow-up Disposition:  Return in about 6 weeks (around 3/20/2018), or if symptoms worsen or fail to improve, for anemia.  lab results and schedule of future lab studies reviewed with patient  reviewed medications and side effects in detail  Continue ferrous sulfate daily.     - Alarm signals discussed. ER precautions  - Plan of care reviewed with patient. Understanding verbalized and they are in agreement with plan of care.

## 2018-02-06 NOTE — TELEPHONE ENCOUNTER
Spoke with Altagracia Spring (Pharmacist0 @ [facility] to cancelled Baclofen 10 mg and Adult Multivitamin Gummies 200 mcg for patient. Spoke with pharmacist  @ [address] to fill above prescriptions for patient.

## 2018-02-07 ENCOUNTER — HOSPITAL ENCOUNTER (OUTPATIENT)
Dept: LAB | Age: 33
Discharge: HOME | End: 2018-02-07
Payer: COMMERCIAL

## 2018-02-07 LAB
ALBUMIN SERPL-MCNC: 3.6 G/DL (ref 3.4–5)
ALBUMIN/GLOB SERPL: 0.9 {RATIO} (ref 0.8–1.7)
ALP SERPL-CCNC: 90 U/L (ref 45–117)
ALT SERPL-CCNC: 18 U/L (ref 13–56)
ANION GAP SERPL CALC-SCNC: 4 MMOL/L (ref 3–18)
AST SERPL-CCNC: 17 U/L (ref 15–37)
BASOPHILS # BLD: 0 K/UL (ref 0–0.06)
BASOPHILS NFR BLD: 0 % (ref 0–3)
BILIRUB SERPL-MCNC: 1 MG/DL (ref 0.2–1)
BUN SERPL-MCNC: 10 MG/DL (ref 7–18)
BUN/CREAT SERPL: 16 (ref 12–20)
CALCIUM SERPL-MCNC: 9.5 MG/DL (ref 8.5–10.1)
CHLORIDE SERPL-SCNC: 107 MMOL/L (ref 100–108)
CO2 SERPL-SCNC: 28 MMOL/L (ref 21–32)
CREAT SERPL-MCNC: 0.63 MG/DL (ref 0.6–1.3)
DIFFERENTIAL METHOD BLD: ABNORMAL
EOSINOPHIL # BLD: 0.5 K/UL (ref 0–0.4)
EOSINOPHIL NFR BLD: 5 % (ref 0–5)
ERYTHROCYTE [DISTWIDTH] IN BLOOD BY AUTOMATED COUNT: 34.7 % (ref 11.6–14.5)
GLOBULIN SER CALC-MCNC: 4 G/DL (ref 2–4)
GLUCOSE SERPL-MCNC: 102 MG/DL (ref 74–99)
HCT VFR BLD AUTO: 31.2 % (ref 35–45)
HGB BLD-MCNC: 8.9 G/DL (ref 12–16)
LYMPHOCYTES # BLD: 1.9 K/UL (ref 0.8–3.5)
LYMPHOCYTES NFR BLD: 20 % (ref 20–51)
MAGNESIUM SERPL-MCNC: 2.2 MG/DL (ref 1.6–2.6)
MCH RBC QN AUTO: 20.9 PG (ref 24–34)
MCHC RBC AUTO-ENTMCNC: 28.5 G/DL (ref 31–37)
MCV RBC AUTO: 73.4 FL (ref 74–97)
MONOCYTES # BLD: 1 K/UL (ref 0–1)
MONOCYTES NFR BLD: 11 % (ref 2–9)
NEUTS SEG # BLD: 6.1 K/UL (ref 1.8–8)
NEUTS SEG NFR BLD: 64 % (ref 42–75)
PLATELET # BLD AUTO: 424 K/UL (ref 135–420)
PLATELET COMMENTS,PCOM: ABNORMAL
POTASSIUM SERPL-SCNC: 4.3 MMOL/L (ref 3.5–5.5)
PROT SERPL-MCNC: 7.6 G/DL (ref 6.4–8.2)
RBC # BLD AUTO: 4.25 M/UL (ref 4.2–5.3)
RBC MORPH BLD: ABNORMAL
SODIUM SERPL-SCNC: 139 MMOL/L (ref 136–145)
WBC # BLD AUTO: 9.5 K/UL (ref 4.6–13.2)

## 2018-02-07 PROCEDURE — 80053 COMPREHEN METABOLIC PANEL: CPT | Performed by: EMERGENCY MEDICINE

## 2018-02-07 PROCEDURE — 85025 COMPLETE CBC W/AUTO DIFF WBC: CPT | Performed by: EMERGENCY MEDICINE

## 2018-02-07 PROCEDURE — 83735 ASSAY OF MAGNESIUM: CPT | Performed by: EMERGENCY MEDICINE

## 2018-02-07 PROCEDURE — 36415 COLL VENOUS BLD VENIPUNCTURE: CPT | Performed by: EMERGENCY MEDICINE

## 2018-02-08 LAB — PERIPHERAL SMEAR,PSM: NORMAL

## 2018-02-09 ENCOUNTER — HOSPITAL ENCOUNTER (OUTPATIENT)
Dept: CT IMAGING | Age: 33
Discharge: HOME | End: 2018-02-09
Attending: NURSE PRACTITIONER
Payer: COMMERCIAL

## 2018-02-09 ENCOUNTER — TELEPHONE (OUTPATIENT)
Dept: FAMILY MEDICINE CLINIC | Facility: CLINIC | Age: 33
End: 2018-02-09
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
## 2018-02-09 DIAGNOSIS — R51.9 NONINTRACTABLE HEADACHE, UNSPECIFIED CHRONICITY PATTERN, UNSPECIFIED HEADACHE TYPE: Primary | ICD-10-CM

## 2018-02-09 DIAGNOSIS — R40.20 LOSS OF CONSCIOUSNESS (HCC): ICD-10-CM

## 2018-02-09 PROCEDURE — 70470 CT HEAD/BRAIN W/O & W/DYE: CPT

## 2018-02-09 PROCEDURE — 74011636320 HC RX REV CODE- 636/320: Performed by: NURSE PRACTITIONER

## 2018-02-09 RX ADMIN — IOPAMIDOL 75 ML: 612 INJECTION, SOLUTION INTRAVENOUS at 09:17

## 2018-02-09 NOTE — TELEPHONE ENCOUNTER
Farheen Sanchez from [facility] stated that they need an order without contrast if the patient diagnosis is headache, because they don't give contrast for diagnosis due to loss of consciousness.  Order was faxed to [phone]

## 2018-02-12 ENCOUNTER — TELEPHONE (OUTPATIENT)
Dept: FAMILY MEDICINE CLINIC | Facility: CLINIC | Age: 33
End: 2018-02-12

## 2018-02-12 DIAGNOSIS — G89.29 CHRONIC LOW BACK PAIN WITHOUT SCIATICA, UNSPECIFIED BACK PAIN LATERALITY: ICD-10-CM

## 2018-02-12 DIAGNOSIS — M54.50 CHRONIC LOW BACK PAIN WITHOUT SCIATICA, UNSPECIFIED BACK PAIN LATERALITY: ICD-10-CM

## 2018-02-12 RX ORDER — TIZANIDINE 4 MG/1
4 TABLET ORAL
Qty: 90 TAB | Refills: 0 | Status: SHIPPED | OUTPATIENT
Start: 2018-02-12 | End: 2019-02-23

## 2018-02-12 NOTE — PROGRESS NOTES
Patient made aware of normal CTof head, moderate sinus disease and pcp OTC recommendations results. Verified name and . Patient verbalized an understanding of results and did not voice any concerns at this time.

## 2018-02-12 NOTE — TELEPHONE ENCOUNTER
Ms Anahy Deutsch stated she was advise that she can not take Naproxen due to Gastric Bypass , need something else for pain.

## 2018-02-14 ENCOUNTER — APPOINTMENT (OUTPATIENT)
Dept: PHYSICAL THERAPY | Age: 33
End: 2018-02-14

## 2018-02-15 ENCOUNTER — HOSPITAL ENCOUNTER (OUTPATIENT)
Dept: PHYSICAL THERAPY | Age: 33
Discharge: HOME | End: 2018-02-15
Payer: COMMERCIAL

## 2018-02-15 PROCEDURE — 97162 PT EVAL MOD COMPLEX 30 MIN: CPT

## 2018-02-15 PROCEDURE — 97110 THERAPEUTIC EXERCISES: CPT

## 2018-02-15 NOTE — PROGRESS NOTES
PT DAILY TREATMENT NOTE - [facility]     Patient Name: Carine Ortiz  Date:2/15/2018  : 1985  [x]  Patient  Verified  Payor: BLUE CROSS / Plan: Cricket Thomson [id number] PPO / Product Type: PPO /    In time:1040  Out time:1120  Total Treatment Time (min): 40    Visit #: 1 of 8    Treatment Area: Low back pain [M54.5]  Other chronic pain [G89.29]    SUBJECTIVE  Pain Level (0-10 scale): -8/10  Any medication changes, allergies to medications, adverse drug reactions, diagnosis change, or new procedure performed?: [x] No    [] Yes (see summary sheet for update)  Subjective functional status/changes:   [] No changes reported  Back pain before falls x >2 yrs. 1 fall was tripped and 2nd fall on ice. Falls and passes out dec and January. Feels like dizzy and legs go numb standing or sitting  Intermittent tingling  Pain is constant. Relief laying on stomach. OBJECTIVE    25 min []Eval                  []Re-Eval       15 min Therapeutic Exercise:  [] See flow sheet :   Rationale: increase ROM to improve the patients ability to ease with ADL's          With   [] TE   [] TA   [] neuro   [] other: Patient Education: [x] Review HEP    [] Progressed/Changed HEP based on:   [] positioning   [] body mechanics   [] transfers   [] heat/ice application    [] other:      Other Objective/Functional Measures: Pt has palpable tenderness to her LS and up her back. R>L. Increased Lordosis. Radiating sx intermittently. Educated on log roll out of bed. Acute-like sx. Pt reports she may not be able to get out of her home much due to constant fatigue and weakness from anemia. Unable to perform LS ROM due to acuteness of sx. Ambulates with an antalgic gait. Pt educated on use of a RW for stability.   Pain Level (0-10 scale) post treatment: 8/10    ASSESSMENT/Changes in Function: see POC    Patient will continue to benefit from skilled PT services to modify and progress therapeutic interventions, address functional mobility deficits, address ROM deficits, address strength deficits, analyze and address soft tissue restrictions, analyze and cue movement patterns, analyze and modify body mechanics/ergonomics and assess and modify postural abnormalities to attain remaining goals.      []  See Plan of Care  []  See progress note/recertification  []  See Discharge Summary         Progress towards goals / Updated goals:  See POC    PLAN  []  Upgrade activities as tolerated     []  Continue plan of care  []  Update interventions per flow sheet       []  Discharge due to:_  []  Other:_      Annette Fitzgerald, PT 2/15/2018  10:43 AM    Future Appointments  Date Time Provider [facility]   3/6/2018 2:45 PM Henry Deutsch, [address] [place] [address]   3/20/2018 9:15 AM ABRIL [address]

## 2018-02-16 ENCOUNTER — TELEPHONE (OUTPATIENT)
Dept: FAMILY MEDICINE CLINIC | Facility: CLINIC | Age: 33
End: 2018-02-16

## 2018-02-16 ENCOUNTER — HOSPITAL ENCOUNTER (EMERGENCY)
Age: 33
Discharge: HOME | End: 2018-02-17
Attending: EMERGENCY MEDICINE
Payer: COMMERCIAL

## 2018-02-16 ENCOUNTER — HOSPITAL ENCOUNTER (OUTPATIENT)
Dept: PHYSICAL THERAPY | Age: 33
End: 2018-02-16
Payer: COMMERCIAL

## 2018-02-16 VITALS
OXYGEN SATURATION: 98 % | WEIGHT: 244.2 LBS | RESPIRATION RATE: 17 BRPM | TEMPERATURE: 98.1 F | HEART RATE: 88 BPM | DIASTOLIC BLOOD PRESSURE: 89 MMHG | BODY MASS INDEX: 46.14 KG/M2 | SYSTOLIC BLOOD PRESSURE: 140 MMHG

## 2018-02-16 DIAGNOSIS — N93.8 DYSFUNCTIONAL UTERINE BLEEDING: Primary | ICD-10-CM

## 2018-02-16 LAB
ABO + RH BLD: NORMAL
ALBUMIN SERPL-MCNC: 3.8 G/DL (ref 3.4–5)
ALBUMIN/GLOB SERPL: 0.8 {RATIO} (ref 0.8–1.7)
ALP SERPL-CCNC: 84 U/L (ref 45–117)
ALT SERPL-CCNC: 21 U/L (ref 13–56)
ANION GAP SERPL CALC-SCNC: 9 MMOL/L (ref 3–18)
APPEARANCE UR: CLEAR
AST SERPL-CCNC: 47 U/L (ref 15–37)
BACTERIA URNS QL MICRO: ABNORMAL /HPF
BASOPHILS # BLD: 0 K/UL (ref 0–0.1)
BASOPHILS NFR BLD: 0 % (ref 0–2)
BILIRUB SERPL-MCNC: 0.5 MG/DL (ref 0.2–1)
BILIRUB UR QL: NEGATIVE
BLOOD GROUP ANTIBODIES SERPL: NORMAL
BUN SERPL-MCNC: 9 MG/DL (ref 7–18)
BUN/CREAT SERPL: 11 (ref 12–20)
CALCIUM SERPL-MCNC: 9.5 MG/DL (ref 8.5–10.1)
CHLORIDE SERPL-SCNC: 102 MMOL/L (ref 100–108)
CO2 SERPL-SCNC: 25 MMOL/L (ref 21–32)
COLOR UR: YELLOW
CREAT SERPL-MCNC: 0.84 MG/DL (ref 0.6–1.3)
DIFFERENTIAL METHOD BLD: ABNORMAL
EOSINOPHIL # BLD: 0.6 K/UL (ref 0–0.4)
EOSINOPHIL NFR BLD: 5 % (ref 0–5)
EPITH CASTS URNS QL MICRO: ABNORMAL /LPF (ref 0–5)
ERYTHROCYTE [DISTWIDTH] IN BLOOD BY AUTOMATED COUNT: 31.8 % (ref 11.6–14.5)
GLOBULIN SER CALC-MCNC: 5 G/DL (ref 2–4)
GLUCOSE SERPL-MCNC: 83 MG/DL (ref 74–99)
GLUCOSE UR STRIP.AUTO-MCNC: NEGATIVE MG/DL
HCG UR QL: NEGATIVE
HCT VFR BLD AUTO: 33 % (ref 35–45)
HGB BLD-MCNC: 9.9 G/DL (ref 12–16)
HGB UR QL STRIP: ABNORMAL
KETONES UR QL STRIP.AUTO: ABNORMAL MG/DL
LEUKOCYTE ESTERASE UR QL STRIP.AUTO: NEGATIVE
LYMPHOCYTES # BLD: 3.2 K/UL (ref 0.9–3.6)
LYMPHOCYTES NFR BLD: 26 % (ref 21–52)
MCH RBC QN AUTO: 22.1 PG (ref 24–34)
MCHC RBC AUTO-ENTMCNC: 30 G/DL (ref 31–37)
MCV RBC AUTO: 73.7 FL (ref 74–97)
MONOCYTES # BLD: 0.7 K/UL (ref 0.05–1.2)
MONOCYTES NFR BLD: 6 % (ref 3–10)
NEUTS SEG # BLD: 7.8 K/UL (ref 1.8–8)
NEUTS SEG NFR BLD: 63 % (ref 40–73)
NITRITE UR QL STRIP.AUTO: NEGATIVE
PH UR STRIP: 5 [PH] (ref 5–8)
PLATELET # BLD AUTO: 177 K/UL (ref 135–420)
PLATELET COMMENTS,PCOM: ABNORMAL
POTASSIUM SERPL-SCNC: 4.2 MMOL/L (ref 3.5–5.5)
PROT SERPL-MCNC: 8.8 G/DL (ref 6.4–8.2)
PROT UR STRIP-MCNC: NEGATIVE MG/DL
RBC # BLD AUTO: 4.48 M/UL (ref 4.2–5.3)
RBC #/AREA URNS HPF: ABNORMAL /HPF (ref 0–5)
RBC MORPH BLD: ABNORMAL
SODIUM SERPL-SCNC: 136 MMOL/L (ref 136–145)
SP GR UR REFRACTOMETRY: 1.02 (ref 1–1.03)
SPECIMEN EXP DATE BLD: NORMAL
UROBILINOGEN UR QL STRIP.AUTO: 1 EU/DL (ref 0.2–1)
WBC # BLD AUTO: 12.3 K/UL (ref 4.6–13.2)
WBC URNS QL MICRO: ABNORMAL /HPF (ref 0–4)

## 2018-02-16 PROCEDURE — 87210 SMEAR WET MOUNT SALINE/INK: CPT | Performed by: NURSE PRACTITIONER

## 2018-02-16 PROCEDURE — 81001 URINALYSIS AUTO W/SCOPE: CPT | Performed by: PHYSICIAN ASSISTANT

## 2018-02-16 PROCEDURE — 87491 CHLMYD TRACH DNA AMP PROBE: CPT | Performed by: NURSE PRACTITIONER

## 2018-02-16 PROCEDURE — 81025 URINE PREGNANCY TEST: CPT | Performed by: PHYSICIAN ASSISTANT

## 2018-02-16 PROCEDURE — 80053 COMPREHEN METABOLIC PANEL: CPT | Performed by: PHYSICIAN ASSISTANT

## 2018-02-16 PROCEDURE — 85025 COMPLETE CBC W/AUTO DIFF WBC: CPT | Performed by: PHYSICIAN ASSISTANT

## 2018-02-16 PROCEDURE — 99284 EMERGENCY DEPT VISIT MOD MDM: CPT

## 2018-02-16 PROCEDURE — 86900 BLOOD TYPING SEROLOGIC ABO: CPT | Performed by: PHYSICIAN ASSISTANT

## 2018-02-16 NOTE — PROGRESS NOTES
[facility] [employer] [facility] [address]  [phone] fax    Plan of Care/ Statement of Necessity for Physical Therapy Services    Patient name: Chey Cochran Start of Care: 2018   Referral source: Monique Cash NP : 1985    Medical Diagnosis: Low back pain [M54.5]  Other chronic pain [G89.29]   Onset Date:>2 yrs    Treatment Diagnosis: LBP   Prior Hospitalization: see medical history Provider#: [id number]   Medications: Verified on Patient summary List    Comorbidities:Malaise/Fatigue,  Neurological Anemia, Asthma, Previous Gastric Bypass . Prior Level of Function: Lives with spouse. Loss of consciousness recently, Fainting spells, Falls, Fatigue and Tiredness. The Plan of Care and following information is based on the information from the initial evaluation. Assessment/ key information: Pt reports ongoing LS pain prior to two falls in in Dec/ January. Pt reported she falls and passes out at times. She is being checked for Neurological Anemia currently. Pt reports back pain that starts at the lower LS and moves up her back and mostly on the right side. She also reports Coccyx pain as well. . She is unable to tolerate sitting, standing and ambulation due to severe LS pain. She is independent in transfers. It sometimes radiates into her Lower extremities. She reports 8/10 pain and is currently taking meds for pain relief. She is unable to actively perform LS AROM. LS xray reveals:No acute fracture or malalignment of the lumbar spine. Pt layed on her left side throughout the Evaluation due to pain and fatigue.    Pt will benefit from skilled therapy to improve with LS function, decrease pain and improve ambulation function.     Evaluation Complexity History HIGH Complexity :3+ comorbidities / personal factors will impact the outcome/ POC ; Examination HIGH Complexity : 4+ Standardized tests and measures addressing body structure, function, activity limitation and / or participation in recreation  ;Presentation HIGH Complexity : Unstable and unpredictable characteristics  ; Clinical Decision Making HIGH Complexity : FOTO score of 1- 25   Overall Complexity Rating: HIGH   Problem List: pain affecting function, decrease ROM, decrease strength, impaired gait/ balance, decrease ADL/ functional abilitiies, decrease activity tolerance, decrease flexibility/ joint mobility and decrease transfer abilities   Treatment Plan may include any combination of the following: Therapeutic exercise, Therapeutic activities, Neuromuscular re-education, Physical agent/modality, Gait/balance training, Manual therapy, Patient education, Self Care training, Functional mobility training, Home safety training and Stair training  Patient / Family readiness to learn indicated by: asking questions, trying to perform skills and interest  Persons(s) to be included in education: patient (P)  Barriers to Learning/Limitations: None  Patient Goal (s): decrease pain  Patient Self Reported Health Status: good  Rehabilitation Potential: fair    Short Term Goals: To be accomplished in 1 weeks:   1. Pt will be compliant with a HEP to improve function. Long Term Goals: To be accomplished in 4 weeks:   1. Pt will increase FOTO score by 21  pts to improve function. 2. Pt will report pain at worst <4/10 to ease with ADL's.   3. Pt will report 50% improvement isn sx to perform ADL's with greater ease . Frequency / Duration: Patient to be seen 2 times per week for 4 weeks. Patient/ CarPatient/ Caregiver education and instruction: Diagnosis, prognosis, exercises   [x]  Plan of care has been reviewed with DEMETRIA Fitzgerald, PT 2/16/2018 12:07 PM    ________________________________________________________________________    I certify that the above Therapy Services are being furnished while the patient is under my care.  I agree with the treatment plan and certify that this therapy is necessary.     Physician's Signature:____________________  Date:____________Time: _________    Please sign and return to [facility]  [address] [facility]  [place], [place]  [phone] fax

## 2018-02-16 NOTE — TELEPHONE ENCOUNTER
Patient called stating that she just recently had 2 iron infusions and a blood transfusion. She have been bleeding ever since the procedure she needs to know what to do.

## 2018-02-16 NOTE — TELEPHONE ENCOUNTER
Where is her bleeding from? Any other symptoms? When was her last infusion? If patient is having copious amount of bleeding, I recommend that she present to the ED.

## 2018-02-16 NOTE — TELEPHONE ENCOUNTER
Spoke with patient she stated she was having vaginal bleeding , her last infusion 02/04/18 , she has been bleeding since then, no other symptoms.  Patient was informed to go to the ED now, per her pcp

## 2018-02-17 LAB
SERVICE CMNT-IMP: NORMAL
WET PREP GENITAL: NORMAL

## 2018-02-17 RX ORDER — MEDROXYPROGESTERONE ACETATE 10 MG/1
10 TABLET ORAL DAILY
Qty: 14 TAB | Refills: 0 | Status: SHIPPED | OUTPATIENT
Start: 2018-02-17 | End: 2018-03-03

## 2018-02-17 NOTE — ED PROVIDER NOTES
HPI Comments: Pt presents to the ed with a complaint of vaginal bleeding. Pt states chronic anemia and got a ferritin and iron and pt states she has vaginal bleeding usually after the infusion, this time though the vaginal bleeding did not stop, she has continued to bleed for two weeks and she is now worried she may becoming anemic again. No symptoms of chest pain, sob, or weakness reported. States the bleeding is like a heavy cycle. Patient is a 28 y.o. female presenting with vaginal bleeding. The history is provided by the patient. No  was used. Vaginal Bleeding   This is a recurrent problem. The current episode started more than 1 week ago. The problem occurs constantly. The problem has not changed since onset. Pertinent negatives include no chest pain, no abdominal pain, no headaches and no shortness of breath. Nothing aggravates the symptoms. Nothing relieves the symptoms. Past Medical History:   Diagnosis Date    Anemia NEC     Asthma      delivery     Gallstones     Gastrointestinal disorder        Past Surgical History:   Procedure Laterality Date    HX  SECTION      HX GASTRIC BYPASS      HX ORTHOPAEDIC      correction of bow legs         Family History:   Problem Relation Age of Onset    Hypertension Mother        Social History     Social History    Marital status:      Spouse name: N/A    Number of children: N/A    Years of education: N/A     Occupational History    Not on file. Social History Main Topics    Smoking status: Current Every Day Smoker    Smokeless tobacco: Never Used      Comment: cigar 2 a day    Alcohol use Yes      Comment: occational   liquor drinker    Drug use: No    Sexual activity: Yes     Partners: Male     Birth control/ protection: Condom     Other Topics Concern    Not on file     Social History Narrative         ALLERGIES: Review of patient's allergies indicates no known allergies.     Review of Systems   Constitutional: Negative for fever. Respiratory: Negative for shortness of breath. Cardiovascular: Negative for chest pain. Gastrointestinal: Negative for abdominal pain. Genitourinary: Positive for vaginal bleeding. Skin: Negative for color change and pallor. Neurological: Negative for weakness and headaches. All other systems reviewed and are negative. Vitals:    02/16/18 2034 02/16/18 2120   BP: 140/89    Pulse: 88    Resp: 17    Temp: 98.1 °F (36.7 °C)    SpO2: 98% 98%   Weight: 110.8 kg (244 lb 3.2 oz)             Physical Exam   Constitutional: She is oriented to person, place, and time. She appears well-developed and well-nourished. HENT:   Head: Normocephalic and atraumatic. Eyes: Conjunctivae and EOM are normal. Pupils are equal, round, and reactive to light. Neck: Normal range of motion. Neck supple. Cardiovascular: Normal rate and regular rhythm. Pulmonary/Chest: Effort normal and breath sounds normal.   Abdominal: Soft. Bowel sounds are normal.   Musculoskeletal: Normal range of motion. Neurological: She is alert and oriented to person, place, and time. She has normal reflexes. Skin: Skin is warm and dry. Psychiatric: She has a normal mood and affect. Her behavior is normal. Judgment and thought content normal.   Nursing note and vitals reviewed. MDM  Number of Diagnoses or Management Options  Diagnosis management comments: Pelvic exam with minor amount of bleeding,  hgb 9.9 and hct 33, this is high for pt. I will start her on provera to help decrease bleeding.  Pt to f/u with gyn for f/u on vaginal bleeding       Amount and/or Complexity of Data Reviewed  Clinical lab tests: ordered and reviewed  Review and summarize past medical records: yes  Independent visualization of images, tracings, or specimens: yes    Risk of Complications, Morbidity, and/or Mortality  Presenting problems: moderate  Diagnostic procedures: moderate  Management options: moderate    Patient Progress  Patient progress: stable        ED Course       Pelvic Exam  Date/Time: 2/17/2018 12:04 AM  Performed by: NP  Procedure duration:  10 minutes. Exam assisted by:  rn. Type of exam performed: bimanual and speculum. External genitalia appearance: normal.    Vaginal exam:  bleeding. Bleeding: mild  Cervical exam:  normal.    Specimen(s) collected:  chlamydia and GC.   Bimanual exam:  normal.    Patient tolerance: Patient tolerated the procedure well with no immediate complications                Vitals:  Patient Vitals for the past 12 hrs:   Temp Pulse Resp BP SpO2   02/16/18 2120 - - - - 98 %   02/16/18 2034 98.1 °F (36.7 °C) 88 17 140/89 98 %       Medications ordered:   Medications - No data to display      Lab findings:  Recent Results (from the past 12 hour(s))   URINALYSIS W/ RFLX MICROSCOPIC    Collection Time: 02/16/18  8:40 PM   Result Value Ref Range    Color YELLOW      Appearance CLEAR      Specific gravity 1.024 1.005 - 1.030      pH (UA) 5.0 5.0 - 8.0      Protein NEGATIVE  NEG mg/dL    Glucose NEGATIVE  NEG mg/dL    Ketone TRACE (A) NEG mg/dL    Bilirubin NEGATIVE  NEG      Blood LARGE (A) NEG      Urobilinogen 1.0 0.2 - 1.0 EU/dL    Nitrites NEGATIVE  NEG      Leukocyte Esterase NEGATIVE  NEG     HCG URINE, QL    Collection Time: 02/16/18  8:40 PM   Result Value Ref Range    HCG urine, QL NEGATIVE  NEG     URINE MICROSCOPIC ONLY    Collection Time: 02/16/18  8:40 PM   Result Value Ref Range    WBC 0 to 1 0 - 4 /hpf    RBC TOO NUMEROUS TO COUNT 0 - 5 /hpf    Epithelial cells 1+ 0 - 5 /lpf    Bacteria FEW (A) NEG /hpf   CBC WITH AUTOMATED DIFF    Collection Time: 02/16/18  9:24 PM   Result Value Ref Range    WBC 12.3 4.6 - 13.2 K/uL    RBC 4.48 4.20 - 5.30 M/uL    HGB 9.9 (L) 12.0 - 16.0 g/dL    HCT 33.0 (L) 35.0 - 45.0 %    MCV 73.7 (L) 74.0 - 97.0 FL    MCH 22.1 (L) 24.0 - 34.0 PG    MCHC 30.0 (L) 31.0 - 37.0 g/dL    RDW 31.8 (H) 11.6 - 14.5 %    PLATELET 745 340 - 420 K/uL    NEUTROPHILS 63 40 - 73 %    LYMPHOCYTES 26 21 - 52 %    MONOCYTES 6 3 - 10 %    EOSINOPHILS 5 0 - 5 %    BASOPHILS 0 0 - 2 %    ABS. NEUTROPHILS 7.8 1.8 - 8.0 K/UL    ABS. LYMPHOCYTES 3.2 0.9 - 3.6 K/UL    ABS. MONOCYTES 0.7 0.05 - 1.2 K/UL    ABS. EOSINOPHILS 0.6 (H) 0.0 - 0.4 K/UL    ABS. BASOPHILS 0.0 0.0 - 0.1 K/UL    DF AUTOMATED      PLATELET COMMENTS ADEQUATE PLATELETS      RBC COMMENTS ANISOCYTOSIS  3+        RBC COMMENTS HYPOCHROMIA  2+        RBC COMMENTS POIKILOCYTOSIS  2+        RBC COMMENTS SCHISTOCYTES  1+       METABOLIC PANEL, COMPREHENSIVE    Collection Time: 02/16/18  9:24 PM   Result Value Ref Range    Sodium 136 136 - 145 mmol/L    Potassium 4.2 3.5 - 5.5 mmol/L    Chloride 102 100 - 108 mmol/L    CO2 25 21 - 32 mmol/L    Anion gap 9 3.0 - 18 mmol/L    Glucose 83 74 - 99 mg/dL    BUN 9 7.0 - 18 MG/DL    Creatinine 0.84 0.6 - 1.3 MG/DL    BUN/Creatinine ratio 11 (L) 12 - 20      GFR est AA >60 >60 ml/min/1.73m2    GFR est non-AA >60 >60 ml/min/1.73m2    Calcium 9.5 8.5 - 10.1 MG/DL    Bilirubin, total 0.5 0.2 - 1.0 MG/DL    ALT (SGPT) 21 13 - 56 U/L    AST (SGOT) 47 (H) 15 - 37 U/L    Alk. phosphatase 84 45 - 117 U/L    Protein, total 8.8 (H) 6.4 - 8.2 g/dL    Albumin 3.8 3.4 - 5.0 g/dL    Globulin 5.0 (H) 2.0 - 4.0 g/dL    A-G Ratio 0.8 0.8 - 1.7     TYPE & SCREEN    Collection Time: 02/16/18  9:24 PM   Result Value Ref Range    Crossmatch Expiration 02/19/2018     ABO/Rh(D) AB POSITIVE     Antibody screen NEG    WET PREP    Collection Time: 02/16/18 10:45 PM   Result Value Ref Range    Special Requests: NO SPECIAL REQUESTS      Wet prep NO YEAST,TRICHOMONAS OR CLUE CELLS NOTED          Reevaluation of patient:   I have reassessed the patient. Patient is feeling better and is asking to go home    Disposition:    Diagnosis:   1.  Dysfunctional uterine bleeding        Disposition: to Home      Follow-up Information     Follow up With Details Comments Contact Info    Shlomo Pryor NP In 2 days  [address] [id number] [phone]             Patient's Medications   Start Taking    MEDROXYPROGESTERONE (PROVERA) 10 MG TABLET    Take 1 Tab by mouth daily for 14 days. Continue Taking    DOCUSATE SODIUM (COLACE) 100 MG CAPSULE    Take 1 Cap by mouth two (2) times a day. FERROUS SULFATE (IRON) 325 MG (65 MG IRON) EC TABLET    Take 1 Tab by mouth Daily (before breakfast). [facility]    Donut cushion    MULTIVIT WITH MIN-FOLIC ACID (ADULT MULTIVITAMIN GUMMIES) 200 MCG CHEW    Take 2 Gum by mouth daily. NICOTINE (NICODERM CQ) 7 MG/24 HR    1 Patch by TransDERmal route every twenty-four (24) hours. OTHER    Check CBC, CMP, Mg in 3 days, results to PCP immediately, diagnosis- Anemia    OTHER    Incentive spirometry- use as directed    OTHER    Graded Compression Stockings b/l LE- use as directed    OTHER    Physical Therapy- Evaluate and Treat    OTHER    Multivitamin- Patient states she was on a gummy vitamin recommended by her bariatric surgeon and I have advised her to resume that as directed    OTHER    This is to certify that Dina Flores was admitted to [facility] on 2/[facility]18 and discharged on 2/4/18 , and has been advised to take rest at home for 7 ( seven ) more days and then resume work if symptom free. PANTOPRAZOLE (PROTONIX) 40 MG TABLET    Take 1 Tab by mouth daily. TIZANIDINE (ZANAFLEX) 4 MG TABLET    Take 1 Tab by mouth every six (6) hours as needed. These Medications have changed    No medications on file   Stop Taking    No medications on file       Return to the ER if you are unable to obtain referral as directed. Carlton Watson's  results have been reviewed with her. She has been counseled regarding her diagnosis, treatment, and plan. She verbally conveys understanding and agreement of the signs, symptoms, diagnosis, treatment and prognosis and additionally agrees to follow up as discussed. She also agrees with the care-plan and conveys that all of her questions have been answered. I have also provided discharge instructions for her that include: educational information regarding their diagnosis and treatment, and list of reasons why they would want to return to the ED prior to their follow-up appointment, should her condition change.           Jose Mas FNP-C

## 2018-02-17 NOTE — DISCHARGE INSTRUCTIONS
Abnormal Uterine Bleeding: Care Instructions  Your Care Instructions    Abnormal uterine bleeding (AUB) is irregular bleeding from the uterus that is longer or heavier than usual or does not occur at your regular time. Sometimes it is caused by changes in hormone levels. It can also be caused by growths in the uterus, such as fibroids or polyps. Sometimes a cause cannot be found. You may have heavy bleeding when you are not expecting your period. Your doctor may suggest a pregnancy test, if you think you are pregnant. Follow-up care is a key part of your treatment and safety. Be sure to make and go to all appointments, and call your doctor if you are having problems. It's also a good idea to know your test results and keep a list of the medicines you take. How can you care for yourself at home? · Be safe with medicines. Take pain medicines exactly as directed. ¨ If the doctor gave you a prescription medicine for pain, take it as prescribed. ¨ If you are not taking a prescription pain medicine, ask your doctor if you can take an over-the-counter medicine. · You may be low in iron because of blood loss. Eat a balanced diet that is high in iron and vitamin C. Foods rich in iron include red meat, shellfish, eggs, beans, and leafy green vegetables. Talk to your doctor about whether you need to take iron pills or a multivitamin. When should you call for help? Call 911 anytime you think you may need emergency care. For example, call if:  ? · You passed out (lost consciousness). ?Call your doctor now or seek immediate medical care if:  ? · You have new or worse belly or pelvic pain. ? · You have severe vaginal bleeding. ? · You feel dizzy or lightheaded, or you feel like you may faint. ? Watch closely for changes in your health, and be sure to contact your doctor if:  ? · You think you may be pregnant. ? · Your bleeding gets worse. ? · You do not get better as expected.    Where can you learn more?  Go to http://yaa-luciana.info/. Enter J279 in the search box to learn more about \"Abnormal Uterine Bleeding: Care Instructions. \"  Current as of: October 13, 2016  Content Version: 11.4  © 0734-8733 [employer]. Care instructions adapted under license by [employer] (which disclaims liability or warranty for this information). If you have questions about a medical condition or this instruction, always ask your healthcare professional. [place] [address] any warranty or liability for your use of this information.

## 2018-02-17 NOTE — ED TRIAGE NOTES
Pt states she has been bleeding since she was discharged from hospital.  States she is changing her pad every couple of hours,  States blood is red

## 2018-02-19 LAB
C TRACH RRNA SPEC QL NAA+PROBE: NEGATIVE
N GONORRHOEA RRNA SPEC QL NAA+PROBE: NEGATIVE
SPECIMEN SOURCE: NORMAL

## 2018-02-21 ENCOUNTER — HOSPITAL ENCOUNTER (OUTPATIENT)
Dept: PHYSICAL THERAPY | Age: 33
Discharge: HOME | End: 2018-02-21
Payer: COMMERCIAL

## 2018-02-21 PROCEDURE — 97110 THERAPEUTIC EXERCISES: CPT

## 2018-02-21 NOTE — PROGRESS NOTES
PT DAILY TREATMENT NOTE     Patient Name: Sonja Howell  Date:2018  : 1985  [x]  Patient  Verified  Payor: BLUE CROSS / Plan: Cricket Thomson [id number] PPO / Product Type: PPO /    In time:229  Out time:310  Total Treatment Time (min): 41  Visit #: 2 of 8    Treatment Area: Low back pain [M54.5]  Other chronic pain [G89.29]    SUBJECTIVE  Pain Level (0-10 scale): 6/10  Any medication changes, allergies to medications, adverse drug reactions, diagnosis change, or new procedure performed?: [x] No    [] Yes (see summary sheet for update)  Subjective functional status/changes:   [] No changes reported  Pt stated that she is better than she was at her evaluation    OBJECTIVE    Modality rationale: decrease pain and increase tissue extensibility to improve the patients ability to increase ease with ADLs   Min Type Additional Details    [] Estim:  []Unatt       []IFC  []Premod                        []Other:  []w/ice   []w/heat  Position:  Location:    [] Estim: []Att    []TENS instruct  []NMES                    []Other:  []w/US   []w/ice   []w/heat  Position:  Location:    []  Traction: [] Cervical       []Lumbar                       [] Prone          []Supine                       []Intermittent   []Continuous Lbs:  [] before manual  [] after manual    []  Ultrasound: []Continuous   [] Pulsed                           []1MHz   []3MHz W/cm2:  Location:    []  Iontophoresis with dexamethasone         Location: [] Take home patch   [] In clinic   10 []  Ice     [x]  heat  []  Ice massage  []  Laser   []  Anodyne Position:seated  Location:low back    []  Laser with stim  []  Other:  Position:  Location:    []  Vasopneumatic Device Pressure:       [] lo [] med [] hi   Temperature: [] lo [] med [] hi   [x] Skin assessment post-treatment:  [x]intact []redness- no adverse reaction    []redness  adverse reaction:     31 min Therapeutic Exercise:  [x] See flow sheet :   Rationale: increase ROM and increase strength to improve the patients ability to increase ease with ADLs    With   [x] TE   [] TA   [] neuro   [] other: Patient Education: [x] Review HEP    [] Progressed/Changed HEP based on:   [] positioning   [] body mechanics   [] transfers   [] heat/ice application    [] other:      Other Objective/Functional Measures:   Pt has fair flexibility in B HS > tightness in the R  Needed multiple cues to perform exercises correctly    Pain Level (0-10 scale) post treatment: 4-5/10    ASSESSMENT/Changes in Function:   Initiated therex today per flow sheet. Pt reported compliance with HEP. Pt put forth good effort with all exercises    Patient will continue to benefit from skilled PT services to address functional mobility deficits, address ROM deficits and address strength deficits to attain remaining goals. [x]  See Plan of Care  []  See progress note/recertification  []  See Discharge Summary         Progress towards goals / Updated goals:  Short Term Goals: To be accomplished in 1 weeks:                         1. Pt will be compliant with a HEP to improve function. Goal met. 2/21/18  Long Term Goals: To be accomplished in 4 weeks:                         1. Pt will increase FOTO score by 21  pts to improve function. 2. Pt will report pain at worst <4/10 to ease with ADL's.                         3. Pt will report 50% improvement isn sx to perform ADL's with greater ease .     PLAN  []  Upgrade activities as tolerated     [x]  Continue plan of care  []  Update interventions per flow sheet       []  Discharge due to:_  []  Other:_      Grant Cr PTA 2/21/2018  2:31 PM    Future Appointments  Date Time Provider Basia Narvaez   2/22/2018 11:00 AM Grant Cr PTA [id number]   2/27/2018 10:30 AM Grant Cr PTA [facility]   2/28/2018 11:00 AM Grant Cr PTA [facility]   3/5/2018 11:00 AM Grant Cr PTA [facility]   3/6/2018 2:45 PM Lorna Velazco Elizabeth Bone, [address] [facility]   3/7/2018 11:30 AM [facility] [address]   3/20/2018 9:15 AM ABRIL Ferguson  10.

## 2018-02-22 ENCOUNTER — TELEPHONE (OUTPATIENT)
Dept: FAMILY MEDICINE CLINIC | Facility: CLINIC | Age: 33
End: 2018-02-22

## 2018-02-22 NOTE — TELEPHONE ENCOUNTER
Patient stated that she was still experiencing vaginal bleeding after going to [facility] ERD on 02/26/18, per her pcp. She was prescribed Provera 10 mg per the [facility] doctor. Patient was inform to schedule appointment with GYN as soon as possible, per her pcp, patient stated she had called several local gyn offices and no one could give her an appointment before 03/2018. Patient informed to let them know she is experiencing active bleeding, blood transfusions. Patient was given several gyn office numbers to call and inform us of the appointment.

## 2018-02-26 NOTE — DISCHARGE SUMMARY
[address] Chente Hayes  MR#: [id number]  : 1985  ACCOUNT #: [de-identified]   ADMIT DATE: 2018  DISCHARGE DATE: 2018    DIAGNOSES FOR THE PATIENT INCLUDE:  1. Severe iron-deficiency anemia. 2.  History of gastric bypass. 3.  Ongoing tobacco abuse. 4.  Noncompliance. HOSPITAL COURSE:  This is a 27-year-old female who presented to the ER with fatigue and headache. Patient was noted to be severely anemic. There was evidence of iron deficiency. Patient was admitted to ICU given the degree of anemia. Patient received packed RBC transfusions. Stool was heme negative. Patient remained stable and was transferred out of ICU. PT, OT were consulted. The patient also received some IV Venofer. The patient started to feel better. Patient was counseled regarding quitting smoking. The patient verbalizes understanding. The patient was counseled regarding compliance with medication and regular followup with her doctors. Patient verbalized understanding. Patient was counseled to avoid NSAIDs. The patient verbalized understanding. Patient was hemodynamically stable and was discharged to home with plans for outpatient followup. Patient was advised to follow up with PCP in one week. The patient was advised to follow up with hematologist in 10 days and with GI in two weeks and the gastric bypass surgeon in the next 1 week. DISCHARGE MEDICATIONS: Included:   1. Protonix 40 mg p.o. daily. 2.  Ferrous sulfate 325 mg p.o. daily. 3.  Colace 100 mg p.o. twice daily. 4.  Check a CBC, CMP, magnesium in three days, results to PCP immediately. 5.  Multivitamin once daily as directed by her bariatric surgeon. 6.  Graded compression stockings bilateral lower extremities, use as directed. 7.  Incentive spirometry use as directed. 8.  Check a CBC, CMP, and magnesium in three days. 9.  Nicotine patch 1 patch transdermally q.24h.       The patient was also given a work note. DISPOSITION:  Home. TOTAL TIME FOR THE DISCHARGE: More than 35 minutes.       Peter Whitt MD       VT/HN  D: 02/25/2018 23:39     T: 02/26/2018 12:15  JOB #: [id number]  CC: Yasmany Lizama NP

## 2018-03-05 ENCOUNTER — HOSPITAL ENCOUNTER (OUTPATIENT)
Dept: PHYSICAL THERAPY | Age: 33
Discharge: HOME | End: 2018-03-05
Payer: COMMERCIAL

## 2018-03-05 PROCEDURE — 97110 THERAPEUTIC EXERCISES: CPT

## 2018-03-05 NOTE — PROGRESS NOTES
PT DAILY TREATMENT NOTE     Patient Name: Sanna Ramsey  Date:3/5/2018  : 1985  [x]  Patient  Verified  Payor: BLUE CROSS / Plan: [facility] PPO / Product Type: PPO /    In time:100  Out time:140  Total Treatment Time (min): 40  Visit #: 3 of 8    Treatment Area: Low back pain [M54.5]  Other chronic pain [G89.29]    SUBJECTIVE  Pain Level (0-10 scale): 0/10  Any medication changes, allergies to medications, adverse drug reactions, diagnosis change, or new procedure performed?: [x] No    [] Yes (see summary sheet for update)  Subjective functional status/changes:   [] No changes reported  Pt stated that she has no pain today    OBJECTIVE    Modality rationale: decrease pain and increase tissue extensibility to improve the patients ability to increase ease with ADLs   Min Type Additional Details    [] Estim:  []Unatt       []IFC  []Premod                        []Other:  []w/ice   []w/heat  Position:  Location:    [] Estim: []Att    []TENS instruct  []NMES                    []Other:  []w/US   []w/ice   []w/heat  Position:  Location:    []  Traction: [] Cervical       []Lumbar                       [] Prone          []Supine                       []Intermittent   []Continuous Lbs:  [] before manual  [] after manual    []  Ultrasound: []Continuous   [] Pulsed                           []1MHz   []3MHz W/cm2:  Location:    []  Iontophoresis with dexamethasone         Location: [] Take home patch   [] In clinic   10 []  Ice     [x]  heat  []  Ice massage  []  Laser   []  Anodyne Position:seated  Location:low back    []  Laser with stim  []  Other:  Position:  Location:    []  Vasopneumatic Device Pressure:       [] lo [] med [] hi   Temperature: [] lo [] med [] hi   [x] Skin assessment post-treatment:  [x]intact []redness- no adverse reaction    []redness  adverse reaction:     30 min Therapeutic Exercise:  [x] See flow sheet :   Rationale: increase ROM and increase strength to improve the patients ability to increase ease with aDLs    With   [x] TE   [] TA   [] neuro   [] other: Patient Education: [x] Review HEP    [x] Progressed/Changed HEP based on:   [] positioning   [] body mechanics   [] transfers   [] heat/ice application    [] other:      Other Objective/Functional Measures:   Pt requested to be put on hold for 2 weeks 2* having to take  to multiple doctor appts. She will call and let us know if she needs to return. Pt was issued an updated HEP     Pain Level (0-10 scale) post treatment: 0/10    ASSESSMENT/Changes in Function:   Pt has been placed on hold for 2 weeks. Patient will continue to benefit from skilled PT services to modify and progress therapeutic interventions, address functional mobility deficits, address ROM deficits and address strength deficits to attain remaining goals. [x]  See Plan of Care  []  See progress note/recertification  []  See Discharge Summary         Progress towards goals / Updated goals:  Short Term Goals: To be accomplished in 1 weeks:                         1. Pt will be compliant with a HEP to improve function. Goal met. 2/21/18  Long Term Goals: To be accomplished in 4 weeks:                         4. Pt will increase FOTO score by 21  pts to improve function.                         2. Pt will report pain at worst <4/10 to ease with ADL's. Progressing. Pt reported that she has no pain today. 3/5/18                         3. Pt will report 50% improvement isn sx to perform ADL's with greater ease .     PLAN  []  Upgrade activities as tolerated     [x]  Continue plan of care  []  Update interventions per flow sheet       []  Discharge due to:_  []  Other:_      Clyde Garcia PTA 3/5/2018  1:02 PM    Future Appointments  Date Time Provider Basia Narvaez   3/6/2018 2:45 PM Omer Cueva, [address] [facility]   3/7/2018 11:30 AM Areil Moreira, PT [facility]   3/8/2018 2:30 PM MD Mj Fregoso 3/20/2018 9:15 AM ABRIL Wu-OVI [address]

## 2018-03-07 ENCOUNTER — APPOINTMENT (OUTPATIENT)
Dept: PHYSICAL THERAPY | Age: 33
End: 2018-03-07
Payer: COMMERCIAL

## 2018-03-12 ENCOUNTER — TELEPHONE (OUTPATIENT)
Dept: FAMILY MEDICINE CLINIC | Facility: CLINIC | Age: 33
End: 2018-03-12

## 2018-03-12 NOTE — TELEPHONE ENCOUNTER
Dr. Pineda Sarmad called requesting NP to give him a call regarding disability for patient.  Please call [phone]

## 2018-03-13 NOTE — TELEPHONE ENCOUNTER
Criss Buerger, NP  3/12/18 1:12 PM   Note      Spoke with Dr. Patricia Bradshaw (disability physician) regarding pt disability determination

## 2018-03-30 ENCOUNTER — HOSPITAL ENCOUNTER (OUTPATIENT)
Dept: LAB | Age: 33
Discharge: HOME | End: 2018-03-30
Payer: COMMERCIAL

## 2018-03-30 DIAGNOSIS — Z01.818 PREOP TESTING: ICD-10-CM

## 2018-03-30 LAB
APPEARANCE UR: CLEAR
APTT PPP: 34.7 SEC (ref 23–36.4)
BILIRUB UR QL: NEGATIVE
COLOR UR: YELLOW
ERYTHROCYTE [DISTWIDTH] IN BLOOD BY AUTOMATED COUNT: 22.6 % (ref 11.6–14.5)
GLUCOSE UR STRIP.AUTO-MCNC: NEGATIVE MG/DL
HCG SERPL-ACNC: <1 MIU/ML (ref 0–10)
HCT VFR BLD AUTO: 29.8 % (ref 35–45)
HGB BLD-MCNC: 9.1 G/DL (ref 12–16)
HGB UR QL STRIP: NEGATIVE
INR PPP: 1 (ref 0.8–1.2)
KETONES UR QL STRIP.AUTO: ABNORMAL MG/DL
LEUKOCYTE ESTERASE UR QL STRIP.AUTO: NEGATIVE
MCH RBC QN AUTO: 21.8 PG (ref 24–34)
MCHC RBC AUTO-ENTMCNC: 30.5 G/DL (ref 31–37)
MCV RBC AUTO: 71.5 FL (ref 74–97)
NITRITE UR QL STRIP.AUTO: NEGATIVE
PH UR STRIP: 5.5 [PH] (ref 5–8)
PLATELET # BLD AUTO: 284 K/UL (ref 135–420)
PROT UR STRIP-MCNC: NEGATIVE MG/DL
PROTHROMBIN TIME: 12.9 SEC (ref 11.5–15.2)
RBC # BLD AUTO: 4.17 M/UL (ref 4.2–5.3)
SP GR UR REFRACTOMETRY: >1.03 (ref 1–1.03)
UROBILINOGEN UR QL STRIP.AUTO: 1 EU/DL (ref 0.2–1)
WBC # BLD AUTO: 8.8 K/UL (ref 4.6–13.2)

## 2018-03-30 PROCEDURE — 84702 CHORIONIC GONADOTROPIN TEST: CPT

## 2018-03-30 PROCEDURE — 81003 URINALYSIS AUTO W/O SCOPE: CPT | Performed by: NURSE PRACTITIONER

## 2018-03-30 PROCEDURE — 87389 HIV-1 AG W/HIV-1&-2 AB AG IA: CPT | Performed by: NURSE PRACTITIONER

## 2018-03-30 PROCEDURE — 36415 COLL VENOUS BLD VENIPUNCTURE: CPT | Performed by: NURSE PRACTITIONER

## 2018-03-30 PROCEDURE — 93005 ELECTROCARDIOGRAM TRACING: CPT

## 2018-03-30 PROCEDURE — 85027 COMPLETE CBC AUTOMATED: CPT | Performed by: NURSE PRACTITIONER

## 2018-03-30 PROCEDURE — 85610 PROTHROMBIN TIME: CPT | Performed by: NURSE PRACTITIONER

## 2018-03-30 PROCEDURE — 85730 THROMBOPLASTIN TIME PARTIAL: CPT | Performed by: NURSE PRACTITIONER

## 2018-03-31 LAB
ATRIAL RATE: 84 BPM
CALCULATED P AXIS, ECG09: 63 DEGREES
CALCULATED R AXIS, ECG10: 37 DEGREES
CALCULATED T AXIS, ECG11: 18 DEGREES
DIAGNOSIS, 93000: NORMAL
P-R INTERVAL, ECG05: 124 MS
Q-T INTERVAL, ECG07: 368 MS
QRS DURATION, ECG06: 80 MS
QTC CALCULATION (BEZET), ECG08: 434 MS
VENTRICULAR RATE, ECG03: 84 BPM

## 2018-04-05 LAB
HIV 1+2 AB+HIV1 P24 AG SERPL QL IA: NONREACTIVE
HIV12 RESULT COMMENT, HHIVC: NORMAL

## 2018-07-10 ENCOUNTER — TELEPHONE (OUTPATIENT)
Dept: ONCOLOGY | Age: 33
End: 2018-07-10

## 2018-07-11 ENCOUNTER — OFFICE VISIT (OUTPATIENT)
Dept: ONCOLOGY | Age: 33
End: 2018-07-11

## 2018-07-11 ENCOUNTER — HOSPITAL ENCOUNTER (OUTPATIENT)
Dept: INFUSION THERAPY | Age: 33
Discharge: HOME | End: 2018-07-11
Payer: COMMERCIAL

## 2018-07-11 VITALS
WEIGHT: 259.8 LBS | HEIGHT: 61 IN | HEART RATE: 83 BPM | DIASTOLIC BLOOD PRESSURE: 71 MMHG | TEMPERATURE: 97.6 F | OXYGEN SATURATION: 100 % | SYSTOLIC BLOOD PRESSURE: 106 MMHG | RESPIRATION RATE: 20 BRPM | BODY MASS INDEX: 49.05 KG/M2

## 2018-07-11 VITALS
TEMPERATURE: 97.6 F | RESPIRATION RATE: 20 BRPM | DIASTOLIC BLOOD PRESSURE: 71 MMHG | HEART RATE: 83 BPM | SYSTOLIC BLOOD PRESSURE: 106 MMHG | OXYGEN SATURATION: 100 %

## 2018-07-11 DIAGNOSIS — D50.0 IRON DEFICIENCY ANEMIA DUE TO CHRONIC BLOOD LOSS: Primary | ICD-10-CM

## 2018-07-11 LAB
ALBUMIN SERPL-MCNC: 3.8 G/DL (ref 3.4–5)
ALBUMIN/GLOB SERPL: 0.9 {RATIO} (ref 0.8–1.7)
ALP SERPL-CCNC: 98 U/L (ref 45–117)
ALT SERPL-CCNC: 24 U/L (ref 13–56)
ANION GAP SERPL CALC-SCNC: 9 MMOL/L (ref 3–18)
AST SERPL-CCNC: 19 U/L (ref 15–37)
BASO+EOS+MONOS # BLD AUTO: 0.5 K/UL (ref 0–2.3)
BASO+EOS+MONOS # BLD AUTO: 7 % (ref 0.1–17)
BILIRUB SERPL-MCNC: 0.4 MG/DL (ref 0.2–1)
BUN SERPL-MCNC: 12 MG/DL (ref 7–18)
BUN/CREAT SERPL: 16 (ref 12–20)
CALCIUM SERPL-MCNC: 8.8 MG/DL (ref 8.5–10.1)
CHLORIDE SERPL-SCNC: 105 MMOL/L (ref 100–108)
CO2 SERPL-SCNC: 26 MMOL/L (ref 21–32)
CREAT SERPL-MCNC: 0.75 MG/DL (ref 0.6–1.3)
DIFFERENTIAL METHOD BLD: ABNORMAL
ERYTHROCYTE [DISTWIDTH] IN BLOOD BY AUTOMATED COUNT: 22.2 % (ref 11.5–14.5)
FERRITIN SERPL-MCNC: 4 NG/ML (ref 8–388)
FOLATE SERPL-MCNC: 13.1 NG/ML (ref 3.1–17.5)
GLOBULIN SER CALC-MCNC: 4.3 G/DL (ref 2–4)
GLUCOSE SERPL-MCNC: 97 MG/DL (ref 74–99)
HCT VFR BLD AUTO: 32.8 % (ref 36–48)
HGB BLD-MCNC: 8.7 G/DL (ref 12–16)
IRON SATN MFR SERPL: 4 %
IRON SERPL-MCNC: 21 UG/DL (ref 50–175)
LYMPHOCYTES # BLD: 3.2 K/UL (ref 1.1–5.9)
LYMPHOCYTES NFR BLD: 40 % (ref 14–44)
MCH RBC QN AUTO: 17.5 PG (ref 25–35)
MCHC RBC AUTO-ENTMCNC: 26.5 G/DL (ref 31–37)
MCV RBC AUTO: 65.9 FL (ref 78–102)
NEUTS SEG # BLD: 4.2 K/UL (ref 1.8–9.5)
NEUTS SEG NFR BLD: 53 % (ref 40–70)
PLATELET # BLD AUTO: 302 K/UL (ref 135–420)
POTASSIUM SERPL-SCNC: 4.2 MMOL/L (ref 3.5–5.5)
PROT SERPL-MCNC: 8.1 G/DL (ref 6.4–8.2)
RBC # BLD AUTO: 4.98 M/UL (ref 4.1–5.1)
SODIUM SERPL-SCNC: 140 MMOL/L (ref 136–145)
T4 FREE SERPL-MCNC: 1 NG/DL (ref 0.7–1.5)
TIBC SERPL-MCNC: 549 UG/DL (ref 250–450)
TSH SERPL DL<=0.05 MIU/L-ACNC: 1.5 UIU/ML (ref 0.36–3.74)
VIT B12 SERPL-MCNC: 611 PG/ML (ref 211–911)
WBC # BLD AUTO: 7.9 K/UL (ref 4.5–13)

## 2018-07-11 PROCEDURE — 84443 ASSAY THYROID STIM HORMONE: CPT | Performed by: INTERNAL MEDICINE

## 2018-07-11 PROCEDURE — 83021 HEMOGLOBIN CHROMOTOGRAPHY: CPT | Performed by: INTERNAL MEDICINE

## 2018-07-11 PROCEDURE — 83540 ASSAY OF IRON: CPT | Performed by: INTERNAL MEDICINE

## 2018-07-11 PROCEDURE — 82747 ASSAY OF FOLIC ACID RBC: CPT | Performed by: INTERNAL MEDICINE

## 2018-07-11 PROCEDURE — 82728 ASSAY OF FERRITIN: CPT | Performed by: INTERNAL MEDICINE

## 2018-07-11 PROCEDURE — 36415 COLL VENOUS BLD VENIPUNCTURE: CPT

## 2018-07-11 PROCEDURE — 84439 ASSAY OF FREE THYROXINE: CPT | Performed by: INTERNAL MEDICINE

## 2018-07-11 PROCEDURE — 80053 COMPREHEN METABOLIC PANEL: CPT | Performed by: INTERNAL MEDICINE

## 2018-07-11 PROCEDURE — 85049 AUTOMATED PLATELET COUNT: CPT | Performed by: INTERNAL MEDICINE

## 2018-07-11 PROCEDURE — 85025 COMPLETE CBC W/AUTO DIFF WBC: CPT | Performed by: INTERNAL MEDICINE

## 2018-07-11 PROCEDURE — 82607 VITAMIN B-12: CPT | Performed by: INTERNAL MEDICINE

## 2018-07-11 NOTE — PROGRESS NOTES
Visit Vitals    /71    Pulse 83    Temp 97.6 °F (36.4 °C) (Oral)    Resp 20    Ht 5' 1\" (1.549 m)    Wt 117.8 kg (259 lb 12.8 oz)    SpO2 100%    BMI 49.09 kg/m2

## 2018-07-11 NOTE — PROGRESS NOTES
HPI: Nanda Heimlich is a 35 y.o. female presents today for Anemia  . 36 yo AA female referred for iron defic anemia . hx of gastric bypass  . Severe anemia with hgb at 4 rx ed in hosp2018 -4 units of blood and 2 iv fe infus. Patient is premenopausal -gr 4 . Taking oral fe . Review of Systems   Constitutional: Negative. HENT: Negative. Eyes: Negative. Respiratory: Negative. Cardiovascular: Negative. Gastrointestinal: Positive for constipation and heartburn. Gastric by-pass    Genitourinary: Negative. Musculoskeletal: Negative. Skin: Negative. Neurological: Negative. Endo/Heme/Allergies: Negative. Psychiatric/Behavioral: Negative. Past Surgical History:   Procedure Laterality Date    HX  SECTION      HX GASTRIC BYPASS      HX ORTHOPAEDIC      correction of bow legs       Past Medical History:   Diagnosis Date    Anemia NEC     Asthma      delivery     Gallstones     Gastrointestinal disorder        Social History     Social History    Marital status: SINGLE     Spouse name: N/A    Number of children: N/A    Years of education: N/A     Occupational History    Not on file. Social History Main Topics    Smoking status: Current Every Day Smoker    Smokeless tobacco: Never Used      Comment: cigar 2 a day    Alcohol use Yes      Comment: occational   liquor drinker    Drug use: No    Sexual activity: Yes     Partners: Male     Birth control/ protection: Condom     Other Topics Concern    Not on file     Social History Narrative       No Known Allergies    Current Outpatient Prescriptions   Medication Sig Dispense Refill    tiZANidine (ZANAFLEX) 4 mg tablet Take 1 Tab by mouth every six (6) hours as needed. 90 Tab 0    multivit with min-folic acid (ADULT MULTIVITAMIN GUMMIES) 200 mcg chew Take 2 Gum by mouth daily.  120 Tab 1    ferrous sulfate (IRON) 325 mg (65 mg iron) EC tablet Take 1 Tab by mouth Daily (before breakfast). 30 Tab 0    pantoprazole (PROTONIX) 40 mg tablet Take 1 Tab by mouth daily. 30 Tab 0    docusate sodium (COLACE) 100 mg capsule Take 1 Cap by mouth two (2) times a day. 60 Cap 0    OTHER Check CBC, CMP, Mg in 3 days, results to PCP immediately, diagnosis- Anemia 1 Each 0    OTHER Incentive spirometry- use as directed 1 Each 0    OTHER Graded Compression Stockings b/l LE- use as directed 1 Each 0    OTHER Physical Therapy- Evaluate and Treat 1 Each 0    OTHER Multivitamin- Patient states she was on a gummy vitamin recommended by her bariatric surgeon and I have advised her to resume that as directed 1 Each 0    nicotine (NICODERM CQ) 7 mg/24 hr 1 Patch by TransDERmal route every twenty-four (24) hours. 10 Patch 0    OTHER This is to certify that Richie Gonzalez was [facility] to [facility] on 2/2/18 and discharged on 2/4/18 , and has been advised to take rest at home for 7 ( seven ) more days and then resume work if symptom free. 1 Each 0    miscellaneous medical supply misc Donut cushion 1 Each 0         Vitals:    07/11/18 1406   BP: 106/71   Pulse: 83   Resp: 20   Temp: 97.6 °F (36.4 °C)   TempSrc: Oral   SpO2: 100%   Weight: 117.8 kg (259 lb 12.8 oz)   Height: 5' 1\" (1.549 m)   PainSc:   0 - No pain          Physical Exam   Constitutional: She is oriented to person, place, and time. obese   HENT:   neg   Eyes: Pupils are equal, round, and reactive to light. Neck: No thyromegaly present. Cardiovascular: Normal rate and regular rhythm. No murmur heard. Pulmonary/Chest: Breath sounds normal. She has no wheezes. She has no rales. Abdominal: She exhibits no mass. Musculoskeletal: She exhibits no edema. Lymphadenopathy:     She has no cervical adenopathy. Neurological: She is alert and oriented to person, place, and time. Gait normal.   Skin: Skin is dry. Psychiatric: Mood, memory, affect and judgment normal.       Assessment/Plan    ICD-10-CM ICD-9-CM    1.  Iron deficiency anemia due to chronic blood loss D50.0 280.0 CBC WITH AUTOMATED DIFF      METABOLIC PANEL, COMPREHENSIVE      IRON PROFILE      FERRITIN      RBC, FOLATE      VITAMIN B12 & FOLATE      TSH AND FREE T4   1-iron defic anemia 2nd gastric bypass and premenopausal status . Iv fe if needed  2-r/o fol and b12 defic  3-labs today :cbc ,cmp ,fe panel ,ferr ,folate ,b12 ,t4 ,tsh ,hgb electro  4-rtc 2 weeks re labs and heme rx if needed-Addendum :hgb down to 8.7 ;iron defic ferr 4 ,fe sat 4% . Proceed with iv injectifer fe rx x 2 doses See in several weeks to review pending labs -may need gi screening studies even though gastic bypass and premenopausal are 2 causes for her low fe

## 2018-07-11 NOTE — PROGRESS NOTES
[facility] Progress Note    Date: 2018    Name: Heather Jay    MRN: [id number]         : 1985    Peripheral Lab Draw      Ms. Watson to [facility], ambulatory at 1520 accompanied by her . Pt was assessed and education was provided. Ms. [place] Sutherland vitals were reviewed and patient was observed for 5 minutes prior to treatment. Visit Vitals    /71 (BP 1 Location: Right arm, BP Patient Position: Sitting)    Pulse 83    Temp 97.6 °F (36.4 °C)    Resp 20    SpO2 100%       Blood obtained peripherally from right arm x 1 attempt with butterfly needle and sent to lab for HEMOGLOBIN FRACTIONATION, CBC,CMP,IRON PROFILE, FERRITIN, RBC FOLATE, VITAMIN B12 & FOLATE, TSH &FREE T4 per written orders. PLT COUNT . No bleeding or hematoma noted at site. Guaze and coban applied. Recent Results (from the past 12 hour(s))   CBC WITH 3 PART DIFF    Collection Time: 18  3:20 PM   Result Value Ref Range    WBC 7.9 4.5 - 13.0 K/uL    RBC 4.98 4.10 - 5.10 M/uL    HGB 8.7 (L) 12.0 - 16.0 g/dL    HCT 32.8 (L) 36 - 48 %    MCV 65.9 (L) 78 - 102 FL    MCH 17.5 (L) 25.0 - 35.0 PG    MCHC 26.5 (L) 31 - 37 g/dL    RDW 22.2 (H) 11.5 - 14.5 %    NEUTROPHILS 53 40 - 70 %    MIXED CELLS 7 0.1 - 17 %    LYMPHOCYTES 40 14 - 44 %    ABS. NEUTROPHILS 4.2 1.8 - 9.5 K/UL    ABS. MIXED CELLS 0.5 0.0 - 2.3 K/uL    ABS. LYMPHOCYTES 3.2 1.1 - 5.9 K/UL    DF AUTOMATED         Ms. Watson tolerated the phlebotomy, and had no complaints. Patient armband removed and shredded. Ms. Amos Riedel was discharged from [place] [address] in stable condition at 1530. She is to return to her physician for a follow-up.     Hebert Huerta, Certified Medical Assistant  2018  3:38 PM

## 2018-07-12 LAB
FOLATE BLD-MCNC: 305 NG/ML
FOLATE RBC-MCNC: 987 NG/ML
HCT VFR BLD AUTO: 30.9 % (ref 34–46.6)

## 2018-07-13 ENCOUNTER — TELEPHONE (OUTPATIENT)
Dept: ONCOLOGY | Age: 33
End: 2018-07-13

## 2018-07-13 LAB
DEPRECATED HGB OTHER BLD-IMP: 0.9 %
HGB A MFR BLD: 98.3 % (ref 96.4–98.8)
HGB A2 MFR BLD COLUMN CHROM: 0.8 % (ref 1.8–3.2)
HGB C MFR BLD: 0 %
HGB F MFR BLD: 0 % (ref 0–2)
HGB FRACT BLD-IMP: ABNORMAL
HGB S BLD QL SOLY: NEGATIVE
HGB S MFR BLD: 0 %

## 2018-07-18 ENCOUNTER — HOSPITAL ENCOUNTER (OUTPATIENT)
Dept: INFUSION THERAPY | Age: 33
Discharge: HOME | End: 2018-07-18
Payer: COMMERCIAL

## 2018-07-18 VITALS
HEIGHT: 61 IN | BODY MASS INDEX: 48.9 KG/M2 | RESPIRATION RATE: 20 BRPM | TEMPERATURE: 98 F | OXYGEN SATURATION: 100 % | DIASTOLIC BLOOD PRESSURE: 82 MMHG | WEIGHT: 259 LBS | SYSTOLIC BLOOD PRESSURE: 120 MMHG | HEART RATE: 65 BPM

## 2018-07-18 PROCEDURE — 96374 THER/PROPH/DIAG INJ IV PUSH: CPT

## 2018-07-18 PROCEDURE — 74011250636 HC RX REV CODE- 250/636: Performed by: INTERNAL MEDICINE

## 2018-07-18 RX ORDER — SODIUM CHLORIDE 0.9 % (FLUSH) 0.9 %
10-40 SYRINGE (ML) INJECTION AS NEEDED
Status: DISCONTINUED | OUTPATIENT
Start: 2018-07-18 | End: 2018-07-22 | Stop reason: HOSPADM

## 2018-07-18 RX ADMIN — FERRIC CARBOXYMALTOSE INJECTION 750 MG: 50 INJECTION, SOLUTION INTRAVENOUS at 11:20

## 2018-07-18 RX ADMIN — Medication 10 ML: at 11:20

## 2018-07-18 RX ADMIN — Medication 10 ML: at 11:29

## 2018-07-18 NOTE — PROGRESS NOTES
[facility] Progress Note    Date: 2018    Name: Radhika Malone    MRN: [id number]         : 1985    Injectafer Infusion 1 of 2    Ms. Watson to [facility], ambulatory, at 1100. Pt was assessed and education was provided. Ms. Kat Das vitals were reviewed and patient was observed for 5 minutes prior to treatment. Visit Vitals    /82 (BP 1 Location: Right arm, BP Patient Position: Sitting)    Pulse 65    Temp 98 °F (36.7 °C)    Resp 20    Ht 5' 1\" (1.549 m)    Wt 117.5 kg (259 lb)    SpO2 100%    Breastfeeding No    BMI 48.94 kg/m2     Patient Vitals for the past 12 hrs:   Temp Pulse Resp BP SpO2   18 1200 98 °F (36.7 °C) 65 20 120/82 100 %   18 1112 97.1 °F (36.2 °C) 75 20 126/80 100 %           22 g PIV placed in RAC x 1 attempt. PIV flushed easily and had brisk blood return. Injectafer 750 mg was given IVP per pharmacy instruction. VS stable and pt denied complaints of itching, lip/tongue/facial swelling, SOB, CP or other complaints. Ms. Ester Zavala tolerated the infusion, and had no complaints. VS remained stable. PIV flushed with NS 10 ml and removed. No bleeding or hematoma noted at site. Guaze and coban applied. Reviewed discharge instructions with patient, including expected side effects (abdominal cramping, nausea, changes in color of urine or feces) and signs of allergic reaction requiring medical attention (itching/hives/rashes, SOB, chest pain, lip/tongue/facial swelling). Patient given printed copy to take home. Patient verbalized understanding of discharge instructions. Patient armband removed and shredded. Ms. Ester Zavala was discharged from [place] [address] in stable condition at 1200. She is to follow up with physician as needed.     Aure Moon RN  2018

## 2018-07-23 ENCOUNTER — APPOINTMENT (OUTPATIENT)
Dept: INFUSION THERAPY | Age: 33
End: 2018-07-23
Payer: COMMERCIAL

## 2018-07-24 ENCOUNTER — TELEPHONE (OUTPATIENT)
Dept: ONCOLOGY | Age: 33
End: 2018-07-24

## 2018-07-25 ENCOUNTER — HOSPITAL ENCOUNTER (OUTPATIENT)
Dept: INFUSION THERAPY | Age: 33
Discharge: HOME | End: 2018-07-25
Payer: COMMERCIAL

## 2018-07-25 ENCOUNTER — OFFICE VISIT (OUTPATIENT)
Dept: ONCOLOGY | Age: 33
End: 2018-07-25

## 2018-07-25 VITALS
RESPIRATION RATE: 18 BRPM | SYSTOLIC BLOOD PRESSURE: 103 MMHG | HEART RATE: 73 BPM | TEMPERATURE: 98.6 F | DIASTOLIC BLOOD PRESSURE: 73 MMHG | OXYGEN SATURATION: 99 %

## 2018-07-25 VITALS
DIASTOLIC BLOOD PRESSURE: 76 MMHG | OXYGEN SATURATION: 100 % | WEIGHT: 260.8 LBS | RESPIRATION RATE: 18 BRPM | SYSTOLIC BLOOD PRESSURE: 110 MMHG | HEIGHT: 61 IN | HEART RATE: 92 BPM | TEMPERATURE: 97.7 F | BODY MASS INDEX: 49.24 KG/M2

## 2018-07-25 DIAGNOSIS — D50.0 IRON DEFICIENCY ANEMIA DUE TO CHRONIC BLOOD LOSS: Primary | ICD-10-CM

## 2018-07-25 PROCEDURE — 96374 THER/PROPH/DIAG INJ IV PUSH: CPT

## 2018-07-25 PROCEDURE — 74011250636 HC RX REV CODE- 250/636: Performed by: INTERNAL MEDICINE

## 2018-07-25 RX ORDER — SODIUM CHLORIDE 0.9 % (FLUSH) 0.9 %
10-40 SYRINGE (ML) INJECTION AS NEEDED
Status: DISCONTINUED | OUTPATIENT
Start: 2018-07-25 | End: 2018-07-29 | Stop reason: HOSPADM

## 2018-07-25 RX ADMIN — FERRIC CARBOXYMALTOSE INJECTION 750 MG: 50 INJECTION, SOLUTION INTRAVENOUS at 10:42

## 2018-07-25 NOTE — PROGRESS NOTES
Visit Vitals    /76    Pulse 92    Temp 97.7 °F (36.5 °C) (Oral)    Resp 18    Ht 5' 1\" (1.549 m)    Wt 118.3 kg (260 lb 12.8 oz)    SpO2 100%    BMI 49.28 kg/m2

## 2018-07-25 NOTE — MR AVS SNAPSHOT
[address] [facility] 
 
 
 [address] [facility] [address] [address] [phone] Patient: Adrianna Booth MRN: [id number] :1985 Visit Information Date & Time Provider Department Dept. Phone Encounter #  
 2018 10:00 AM MAY HIGGINS, [address] [place] [address] Oncology [phone] [id number] Follow-up Instructions Return in about 4 weeks (around 2018), or if symptoms worsen or fail to improve. Your Appointments 2018  1:00 PM  
Follow Up with MAY HIGGINS MD  
[facility] Oncology [facility] App[facility] Note: 1 month f-up [address] [address] [facility]  
  
   
 [address] [facility] [address] [place] [address] [address] Upcoming Health Maintenance Date Due Pneumococcal 19-64 Medium Risk (1 of 1 - PPSV23) 2004 DTaP/Tdap/Td series (1 - Tdap) 2006 PAP AKA CERVICAL CYTOLOGY 2006 Influenza Age 5 to Adult 2018 Allergies as of 2018  Review Complete On: 2018 By: MAY HIGGINS MD  
 No Known Allergies Current Immunizations  Never Reviewed No immunizations on file. Not reviewed this visit You Were Diagnosed With   
  
 Codes Comments Iron deficiency anemia due to chronic blood loss    -  Primary ICD-10-CM: D50.0 ICD-9-CM: 280.0 Vitals OB Status Smoking Status Unknown Current Every Day Smoker Preferred Pharmacy Pharmacy Name Phone [id number] [address] [phone] Your Updated Medication List  
  
   
This list is accurate as of 18 10:33 AM.  Always use your most recent med list.  
  
  
  
  
 docusate sodium 100 mg capsule Commonly known as:  Sherl Bicker Take 1 Cap by mouth two (2) times a day. ferrous sulfate 325 mg (65 mg iron) EC tablet Commonly known as:  IRON Take 1 Tab by mouth Daily (before breakfast). miscellaneous medical supply [facility] Donut cushion  
  
 multivit with min-folic acid 223 mcg Chew Commonly known as:  ADULT MULTIVITAMIN GUMMIES Take 2 Gum by mouth daily. nicotine 7 mg/24 hr  
Commonly known as:  NICODERM CQ  
1 Patch by TransDERmal route every twenty-four (24) hours. OTHER Check CBC, CMP, Mg in 3 days, results to PCP immediately, diagnosis- Anemia OTHER Incentive spirometry- use as directed OTHER  
Graded Compression Stockings b/l LE- use as directed OTHER Physical Therapy- Evaluate and Treat OTHER Multivitamin- Patient states she was on a gummy vitamin recommended by her bariatric surgeon and I have advised her to resume that as directed OTHER This is to certify that Julia Mcfarlane was admitted to [facility] on 2/2/18 and discharged on 2/4/18 , and has been advised to take rest at home for 7 ( seven ) more days and then resume work if symptom free. pantoprazole 40 mg tablet Commonly known as:  PROTONIX Take 1 Tab by mouth daily. tiZANidine 4 mg tablet Commonly known as:  Junaid Navarrete Take 1 Tab by mouth every six (6) hours as needed. Follow-up Instructions Return in about 4 weeks (around 8/22/2018), or if symptoms worsen or fail to improve. Introducing [facility]! Dear Durga Grady: Thank you for requesting a [employer] account. Our records indicate that you already have an active [employer] account. You can access your account anytime at [email or web address] Did you know that you can access your hospital and ER discharge instructions at any time in [employer]? You can also review all of your test results from your hospital stay or ER visit. Additional Information If you have questions, please visit the Frequently Asked Questions section of the [employer] website at [email or web address] Remember, [employer] is NOT to be used for urgent needs. For medical emergencies, dial 911. Now available from your iPhone and Android! Please provide this summary of care documentation to your next provider. Your primary care clinician is listed as Winston Will. If you have any questions after today's visit, please call [phone].

## 2018-07-25 NOTE — PROGRESS NOTES
[facility] OPI Progress Note    Date: 2018    Name: Leif Stacy    MRN: [id number]         : 1985    Injectafer Infusion 2 of 2    Ms. Watson to [facility], [facility], at 1035. Pt was assessed and education was provided. Ms. Tony Chester vitals were reviewed and patient was observed for 5 minutes prior to treatment. Visit Vitals    /73 (BP 1 Location: Right arm)    Pulse 73    Temp 98.6 °F (37 °C)    Resp 18    SpO2 99%     Patient Vitals for the past 12 hrs:   Temp Pulse Resp BP SpO2   18 1041 98.6 °F (37 °C) 73 18 103/73 99 %           22 g PIV placed in RAC x 1 attempt. PIV flushed easily and had brisk blood return. Injectafer 750 mg was given IVP per pharmacy instruction. VS stable and pt denied complaints of itching, lip/tongue/facial swelling, SOB, CP or other complaints. Ms. Zhang Stauffer tolerated the infusion, and had no complaints. VS remained stable. PIV flushed with NS 10 ml and removed. No bleeding or hematoma noted at site. Guaze and coban applied. Patient armband removed and shredded. Ms. Zhang Stauffer was discharged from [place] [address] in stable condition at 1100. She is to follow up with physician as needed.     Madelaine Zamarripa RN  2018

## 2018-07-25 NOTE — PROGRESS NOTES
HPI: Heather Brunner is a 35 y.o. female presents today for No chief complaint on file. .Patient with SHILA 2nd gastric bypass and premenopausal status. hgb 8.7 prior to 1st dose injectifer iv fe rx    Review of Systems   Constitutional: Negative. HENT: Negative. Eyes: Negative. Respiratory: Negative. Cardiovascular: Negative. Gastrointestinal: Negative. Genitourinary: Negative. Musculoskeletal: Negative. Skin: Negative. Psychiatric/Behavioral: Negative. Past Surgical History:   Procedure Laterality Date    HX  SECTION      HX GASTRIC BYPASS      HX ORTHOPAEDIC      correction of bow legs       Past Medical History:   Diagnosis Date    Anemia NEC     Asthma      delivery     Gallstones     Gastrointestinal disorder        Social History     Social History    Marital status: SINGLE     Spouse name: N/A    Number of children: N/A    Years of education: N/A     Occupational History    Not on file. Social History Main Topics    Smoking status: Current Every Day Smoker    Smokeless tobacco: Never Used      Comment: cigar 2 a day    Alcohol use Yes      Comment: occational   liquor drinker    Drug use: No    Sexual activity: Yes     Partners: Male     Birth control/ protection: Condom     Other Topics Concern    Not on file     Social History Narrative       No Known Allergies    Current Outpatient Prescriptions   Medication Sig Dispense Refill    tiZANidine (ZANAFLEX) 4 mg tablet Take 1 Tab by mouth every six (6) hours as needed. 90 Tab 0    multivit with min-folic acid (ADULT MULTIVITAMIN GUMMIES) 200 mcg chew Take 2 Gum by mouth daily. 120 Tab 1    pantoprazole (PROTONIX) 40 mg tablet Take 1 Tab by mouth daily. 30 Tab 0    ferrous sulfate (IRON) 325 mg (65 mg iron) EC tablet Take 1 Tab by mouth Daily (before breakfast). 30 Tab 0    docusate sodium (COLACE) 100 mg capsule Take 1 Cap by mouth two (2) times a day.  60 Cap 0    OTHER Check CBC, CMP, Mg in 3 days, results to PCP immediately, diagnosis- Anemia 1 Each 0    OTHER Incentive spirometry- use as directed 1 Each 0    OTHER Graded Compression Stockings b/l LE- use as directed 1 Each 0    OTHER Physical Therapy- Evaluate and Treat 1 Each 0    OTHER Multivitamin- Patient states she was on a gummy vitamin recommended by her bariatric surgeon and I have advised her to resume that as directed 1 Each 0    nicotine (NICODERM CQ) 7 mg/24 hr 1 Patch by TransDERmal route every twenty-four (24) hours. 10 Patch 0    OTHER This is to certify that Richie Gonzalez was admitted to [facility] on 2/2/18 and discharged on 2/4/18 , and has been advised to take rest at home for 7 ( seven ) more days and then resume work if symptom free. 1 Each 0    miscellaneous medical supply misc Donut cushion 1 Each 0     Facility-Administered Medications Ordered in Other Visits   Medication Dose Route Frequency Provider Last Rate Last Dose    sodium chloride (NS) flush 10-40 mL  10-40 mL IntraVENous PRN Glendon Hammans, MD             There were no vitals filed for this visit. Physical Exam   Constitutional: She is well-developed, well-nourished, and in no distress. Doing well-getting 2nd dose injectifer today   Eyes: Pupils are equal, round, and reactive to light. Assessment/Plan    ICD-10-CM ICD-9-CM    1. Iron deficiency anemia due to chronic blood loss D50.0 280.0    A/P-IRON DEFICIENCY ANEMIA 2nd to gastric bypass and premenopausal status  1-cont 2nd dose injectifer today.    2-rtc 4 weeks cbc ferr fe sat

## 2018-07-30 ENCOUNTER — APPOINTMENT (OUTPATIENT)
Dept: INFUSION THERAPY | Age: 33
End: 2018-07-30
Payer: COMMERCIAL

## 2018-08-24 ENCOUNTER — TELEPHONE (OUTPATIENT)
Dept: ONCOLOGY | Age: 33
End: 2018-08-24

## 2018-08-24 ENCOUNTER — HOSPITAL ENCOUNTER (OUTPATIENT)
Dept: INFUSION THERAPY | Age: 33
End: 2018-08-24

## 2018-08-24 DIAGNOSIS — D50.0 IRON DEFICIENCY ANEMIA DUE TO CHRONIC BLOOD LOSS: ICD-10-CM

## 2018-08-24 NOTE — TELEPHONE ENCOUNTER
Left message on voicemail reminding patient of appointment on Monday 8/27/18 @ 1 pm with a 20 dollar copay

## 2018-08-29 NOTE — ANCILLARY DISCHARGE INSTRUCTIONS
[facility] [address] [facility] 
[phone] fax Physical Therapy Discharge Summary Patient name: Rayne Bond Start of Care: 2018 Referral source: Garcia Vazquez NP : 1985 Medical Diagnosis: Low back pain [M54.5] Other chronic pain [G89.29] Onset Date:>2 yrs Treatment Diagnosis: LBP Prior Hospitalization: see medical history Provider#: [id number] Medications: Verified on Patient summary List  
 Comorbidities:Malaise/Fatigue,  Neurological Anemia, Asthma, Previous Gastric Bypass . Prior Level of Function: Lives with spouse. Loss of consciousness recently, Fainting spells, Falls, Fatigue and Tiredness.                
  
 
Visits from Start of Care: 3    Missed Visits: 3 Summary of Care:2/15/18-3/5/18 Pt requested to be put on hold for 2 weeks 2* having to take  to multiple doctor appts. Pt reported 0/10 pain in 3 visits. Pt sx resolved. Pt DC'd 
 
 
ASSESSMENT/RECOMMENDATIONS: 
[]Discontinue therapy: [x]Patient has reached or is progressing toward set goals []Patient is non-compliant or has abdicated 
    []Due to lack of appreciable progress towards set goals Dale Traore, PT 2018 3:34 PM

## 2018-09-10 ENCOUNTER — APPOINTMENT (OUTPATIENT)
Dept: INFUSION THERAPY | Age: 33
End: 2018-09-10

## 2018-09-17 ENCOUNTER — APPOINTMENT (OUTPATIENT)
Dept: INFUSION THERAPY | Age: 33
End: 2018-09-17

## 2018-09-19 ENCOUNTER — TELEPHONE (OUTPATIENT)
Dept: ONCOLOGY | Age: 33
End: 2018-09-19

## 2018-10-04 ENCOUNTER — TELEPHONE (OUTPATIENT)
Dept: ONCOLOGY | Age: 33
End: 2018-10-04

## 2018-10-05 ENCOUNTER — OFFICE VISIT (OUTPATIENT)
Dept: ONCOLOGY | Age: 33
End: 2018-10-05

## 2018-10-05 ENCOUNTER — HOSPITAL ENCOUNTER (OUTPATIENT)
Dept: INFUSION THERAPY | Age: 33
Discharge: HOME | End: 2018-10-05
Payer: COMMERCIAL

## 2018-10-05 VITALS
HEART RATE: 60 BPM | TEMPERATURE: 97 F | RESPIRATION RATE: 18 BRPM | DIASTOLIC BLOOD PRESSURE: 82 MMHG | SYSTOLIC BLOOD PRESSURE: 123 MMHG

## 2018-10-05 DIAGNOSIS — D50.9 IRON DEFICIENCY ANEMIA, UNSPECIFIED IRON DEFICIENCY ANEMIA TYPE: Primary | ICD-10-CM

## 2018-10-05 LAB
ALBUMIN SERPL-MCNC: 3.7 G/DL (ref 3.4–5)
ALBUMIN/GLOB SERPL: 1 {RATIO} (ref 0.8–1.7)
ALP SERPL-CCNC: 94 U/L (ref 45–117)
ALT SERPL-CCNC: 29 U/L (ref 13–56)
ANION GAP SERPL CALC-SCNC: 7 MMOL/L (ref 3–18)
AST SERPL-CCNC: 21 U/L (ref 15–37)
BASO+EOS+MONOS # BLD AUTO: 0.5 K/UL (ref 0–2.3)
BASO+EOS+MONOS # BLD AUTO: 8 % (ref 0.1–17)
BILIRUB SERPL-MCNC: 0.3 MG/DL (ref 0.2–1)
BUN SERPL-MCNC: 10 MG/DL (ref 7–18)
BUN/CREAT SERPL: 17 (ref 12–20)
CALCIUM SERPL-MCNC: 8.9 MG/DL (ref 8.5–10.1)
CHLORIDE SERPL-SCNC: 109 MMOL/L (ref 100–108)
CO2 SERPL-SCNC: 24 MMOL/L (ref 21–32)
CREAT SERPL-MCNC: 0.59 MG/DL (ref 0.6–1.3)
DIFFERENTIAL METHOD BLD: ABNORMAL
ERYTHROCYTE [DISTWIDTH] IN BLOOD BY AUTOMATED COUNT: 24.5 % (ref 11.5–14.5)
FERRITIN SERPL-MCNC: 19 NG/ML (ref 8–388)
FOLATE SERPL-MCNC: 7.9 NG/ML (ref 3.1–17.5)
GLOBULIN SER CALC-MCNC: 3.8 G/DL (ref 2–4)
GLUCOSE SERPL-MCNC: 68 MG/DL (ref 74–99)
HCT VFR BLD AUTO: 41.7 % (ref 36–48)
HGB BLD-MCNC: 13.1 G/DL (ref 12–16)
IRON SATN MFR SERPL: 17 %
IRON SERPL-MCNC: 60 UG/DL (ref 50–175)
LYMPHOCYTES # BLD: 2.2 K/UL (ref 1.1–5.9)
LYMPHOCYTES NFR BLD: 38 % (ref 14–44)
MCH RBC QN AUTO: 27 PG (ref 25–35)
MCHC RBC AUTO-ENTMCNC: 31.4 G/DL (ref 31–37)
MCV RBC AUTO: 85.8 FL (ref 78–102)
NEUTS SEG # BLD: 3 K/UL (ref 1.8–9.5)
NEUTS SEG NFR BLD: 54 % (ref 40–70)
PLATELET # BLD AUTO: 188 K/UL (ref 135–420)
POTASSIUM SERPL-SCNC: 4.4 MMOL/L (ref 3.5–5.5)
PROT SERPL-MCNC: 7.5 G/DL (ref 6.4–8.2)
RBC # BLD AUTO: 4.86 M/UL (ref 4.1–5.1)
SODIUM SERPL-SCNC: 140 MMOL/L (ref 136–145)
TIBC SERPL-MCNC: 351 UG/DL (ref 250–450)
VIT B12 SERPL-MCNC: 372 PG/ML (ref 211–911)
WBC # BLD AUTO: 5.7 K/UL (ref 4.5–13)

## 2018-10-05 PROCEDURE — 82607 VITAMIN B-12: CPT | Performed by: INTERNAL MEDICINE

## 2018-10-05 PROCEDURE — 80053 COMPREHEN METABOLIC PANEL: CPT | Performed by: INTERNAL MEDICINE

## 2018-10-05 PROCEDURE — 85025 COMPLETE CBC W/AUTO DIFF WBC: CPT | Performed by: INTERNAL MEDICINE

## 2018-10-05 PROCEDURE — 83540 ASSAY OF IRON: CPT | Performed by: INTERNAL MEDICINE

## 2018-10-05 PROCEDURE — 85049 AUTOMATED PLATELET COUNT: CPT | Performed by: INTERNAL MEDICINE

## 2018-10-05 PROCEDURE — 82728 ASSAY OF FERRITIN: CPT | Performed by: INTERNAL MEDICINE

## 2018-10-05 RX ORDER — CYANOCOBALAMIN 1000 UG/ML
1000 INJECTION, SOLUTION INTRAMUSCULAR; SUBCUTANEOUS ONCE
Qty: 1 ML | Refills: 0
Start: 2018-10-05 | End: 2018-10-05

## 2018-10-05 NOTE — PROGRESS NOTES
[facility] OPIC Progress Note Date: 2018 Name: Alicia Painting MRN: [id number] : 1985 Peripheral Lab Draw Ms. Watson to [address] [place] [address], ambulatory at 1030. Pt was assessed and education was provided. Ms. Ankit De Guzman vitals were reviewed and patient was observed for 5 minutes prior to treatment. Visit Vitals  /82  Pulse 60  Temp 97 °F (36.1 °C)  Resp 18 Blood obtained peripherally from left wrist x 2 attempt with butterfly needle and sent to lab for cbc, cmp, iron profile, ferritin, vtiamin b12 and folate per written orders. No bleeding or hematoma noted at site. Guaze and coban applied. Ms. Cristin Bell tolerated the phlebotomy, and had no complaints. Patient armband removed and shredded. Ms. Cristin Bell was discharged from [place] [address] in stable condition at 1100. She is to follow up with her doctor for her next appointment. April Lemons 2018 
11:03 AM

## 2018-10-08 PROBLEM — E66.01 OBESITY, MORBID (HCC): Status: ACTIVE | Noted: 2018-10-08

## 2018-10-10 ENCOUNTER — TELEPHONE (OUTPATIENT)
Dept: ONCOLOGY | Age: 33
End: 2018-10-10

## 2018-10-11 ENCOUNTER — HOSPITAL ENCOUNTER (OUTPATIENT)
Dept: INFUSION THERAPY | Age: 33
Discharge: HOME | End: 2018-10-11
Payer: COMMERCIAL

## 2018-10-11 ENCOUNTER — OFFICE VISIT (OUTPATIENT)
Dept: ONCOLOGY | Age: 33
End: 2018-10-11

## 2018-10-11 VITALS
TEMPERATURE: 97.5 F | SYSTOLIC BLOOD PRESSURE: 122 MMHG | RESPIRATION RATE: 18 BRPM | OXYGEN SATURATION: 100 % | DIASTOLIC BLOOD PRESSURE: 85 MMHG | HEART RATE: 66 BPM

## 2018-10-11 VITALS
HEART RATE: 74 BPM | TEMPERATURE: 97.3 F | RESPIRATION RATE: 16 BRPM | HEIGHT: 61 IN | OXYGEN SATURATION: 100 % | WEIGHT: 272.4 LBS | SYSTOLIC BLOOD PRESSURE: 128 MMHG | BODY MASS INDEX: 51.43 KG/M2 | DIASTOLIC BLOOD PRESSURE: 80 MMHG

## 2018-10-11 DIAGNOSIS — D64.9 ANEMIA, UNSPECIFIED TYPE: Primary | ICD-10-CM

## 2018-10-11 DIAGNOSIS — D64.9 ANEMIA, UNSPECIFIED TYPE: ICD-10-CM

## 2018-10-11 PROCEDURE — 96372 THER/PROPH/DIAG INJ SC/IM: CPT

## 2018-10-11 PROCEDURE — 74011250636 HC RX REV CODE- 250/636: Performed by: INTERNAL MEDICINE

## 2018-10-11 RX ORDER — CYANOCOBALAMIN 1000 UG/ML
1000 INJECTION, SOLUTION INTRAMUSCULAR; SUBCUTANEOUS ONCE
Status: COMPLETED | OUTPATIENT
Start: 2018-10-11 | End: 2018-10-11

## 2018-10-11 RX ADMIN — CYANOCOBALAMIN 1000 MCG: 1000 INJECTION, SOLUTION INTRAMUSCULAR at 10:44

## 2018-10-11 NOTE — PROGRESS NOTES
[facility] OPIC Progress Note Date: 2018 Name: Sandy Pollack MRN: [id number] : 1985 Ms. Watson to [facility], ambulatory at 1030, ambulatory accompanied by her . Pt was assessed and education was provided. Ms. Bunny Malcolm vitals were reviewed and patient was observed for 5 minutes prior to treatment. Visit Vitals  /85 (BP 1 Location: Left arm, BP Patient Position: At rest)  Pulse 66  Temp 97.5 °F (36.4 °C)  Resp 18  SpO2 100%  Breastfeeding No  
 
 
B12 1000 mcg was given SQ in patient left upper arm. Band-aid applied to site. Reviewed discharge instructions with patient, including expected side effects (abdominal cramping, nausea, changes in color of urine or feces) and signs of allergic reaction requiring medical attention (itching/hives/rashes, SOB, chest pain, lip/tongue/facial swelling). Patient given printed copy to take home. Patient verbalized understanding of discharge instructions. Care Notes viewed and uploaded in patients chart. Patient was observed for 30 mis post injection. Ms. Sally Pabon tolerated well, and had no complaints. Patient armband removed and shredded. Ms. Sally Pabon was discharged from [place] [address] in stable condition at 1115. She is to follow-up with her pcp for further treatment. Teddy Mathur RN 2018

## 2018-10-11 NOTE — PROGRESS NOTES
Oncology Note    Subjective:     Marcy Huertas is a 35 y.o. AAF has a known hx of gastric bypass, has iron and b12 deficiency. She has received Iron infusion in the past and is getting b12 injection. She has undergone blood tests and presents to review the results and asses the need for iron infusion. She c/o fatigue and tiredness. She denies chest pain , dizziness, palpitaitons or sob. Patient Active Problem List    Diagnosis Date Noted    Obesity, morbid ([address] 10/08/2018    Class 3 obesity with serious comorbidity and body mass index (BMI) of 45.0 to 49.9 in adult 2018    H/O gastric bypass 2018    Anemia 2018    Microcytic hypochromic anemia 2018     Past Medical History:   Diagnosis Date    Anemia NEC     Asthma      delivery     Gallstones     Gastrointestinal disorder       Past Surgical History:   Procedure Laterality Date    HX  SECTION      HX GASTRIC BYPASS      HX ORTHOPAEDIC      correction of bow legs      Current Outpatient Prescriptions   Medication Sig    tiZANidine (ZANAFLEX) 4 mg tablet Take 1 Tab by mouth every six (6) hours as needed.  ferrous sulfate (IRON) 325 mg (65 mg iron) EC tablet Take 1 Tab by mouth Daily (before breakfast).  multivit with min-folic acid (ADULT MULTIVITAMIN GUMMIES) 200 mcg chew Take 2 Gum by mouth daily.  pantoprazole (PROTONIX) 40 mg tablet Take 1 Tab by mouth daily.  docusate sodium (COLACE) 100 mg capsule Take 1 Cap by mouth two (2) times a day.     OTHER Check CBC, CMP, Mg in 3 days, results to PCP immediately, diagnosis- Anemia    OTHER Incentive spirometry- use as directed    OTHER Graded Compression Stockings b/l LE- use as directed    OTHER Physical Therapy- Evaluate and Treat    OTHER Multivitamin- Patient states she was on a gummy vitamin recommended by her bariatric surgeon and I have advised her to resume that as directed    nicotine (NICODERM CQ) 7 mg/24 hr 1 Patch by TransDERmal route every twenty-four (24) hours.  OTHER This is to certify that Aissatou Frazier was admitted to [facility] on 2/2/18 and discharged on 2/4/18 , and has been advised to take rest at home for 7 ( seven ) more days and then resume work if symptom free. No current facility-administered medications for this visit. No Known Allergies   Social History   Substance Use Topics    Smoking status: Current Some Day Smoker     Packs/day: 0.25    Smokeless tobacco: Never Used      Comment: 5 or 6 a day, \"trying to quit\"    Alcohol use Yes      Comment: occational   liquor drinker      Family History   Problem Relation Age of Onset    Hypertension Mother     Asthma Father         Review of Systems:  Constitutional No fevers, chills, night sweats, excessive fatigue or weight loss. Allergic/Immunologic No recent allergic reactions   Eyes No significant visual difficulties. No diplopia. ENMT No problems with hearing, no sore throat, no sinus drainage. Endocrine No hot flashes or night sweats. No cold intolerance, polyuria, or polydipsia   Hematologic/Lymphatic No easy bruising or bleeding. The patient denies any tender or palpable lymph nodes   Breasts No abnormal masses of breast, nipple discharge or pain. Respiratory No dyspnea on exertion, orthopnea, chest pain, cough or hemoptysis. Cardiovascular No anginal chest pain, irregular heart beat, tachycardia, palpitations or orthopnea. Gastrointestinal No nausea, vomiting, diarrhea, constipation, cramping, dysphagia, reflux, heartburn, GI bleeding, or early satiety. No change in bowel habits. Genitourinary (M) No hematuria, dysuria, increased frequency, urgency, hesitancy or incontinence. Musculoskeletal No joint pain, swelling or redness. No decreased range of motion. Integumentary No chronic rashes, inflammation, ulcerations, pruritus, petechiae, purpura, ecchymoses, or skin changes.    Neurologic No headache, blurred vision, and no areas of focal weakness or numbness. Normal gait. No sensory problems. Psychiatric No insomnia, depression, marion or mood swings. No psychotropic drugs. Objective:       Physical Exam:   There were no vitals taken for this visit. General:  Alert, cooperative, no distress, appears stated age. Head:  Normocephalic, without obvious abnormality, atraumatic. Eyes:  Conjunctivae/corneas clear. PERRL, EOMs intact. Fundi benign. Ears:  Normal TMs and external ear canals both ears. Nose: Nares normal. Septum midline. Mucosa normal. No drainage or sinus tenderness. Throat: Lips, mucosa, and tongue normal. Teeth and gums normal.   Neck: Supple, symmetrical, trachea midline, no adenopathy, thyroid: no enlargement/tenderness/nodules, no carotid bruit and no JVD. Back:   Symmetric, no curvature. ROM normal. No CVA tenderness. Lungs:   Clear to auscultation bilaterally. Chest wall:  No tenderness or deformity. Heart:  Regular rate and rhythm, S1, S2 normal, no murmur, click, rub or gallop. Breast Exam:  No tenderness, masses, or nipple abnormality. Abdomen:   Soft, non-tender. Bowel sounds normal. No masses,  No organomegaly. Extremities: Extremities normal, atraumatic, no cyanosis or edema. Pulses: 2+ and symmetric all extremities. Skin: Skin color, texture, turgor normal. No rashes or lesions. Lymph nodes: Cervical, supraclavicular, and axillary nodes normal.   Neurologic: CNII-XII intact. Normal strength, sensation and reflexes throughout. Labs:  No results found for this or any previous visit (from the past 24 hour(s)). Assessment:     Active Problems:    * No active hospital problems. *      Plan:   SHILA and B12 deficiency due to gastric bypass: I have reviewed her chart and labs. She has normal hemoglobin but her iron is very low as reflected by Ferritin. I have recommended  Intravenous again with injectafor once a week x 2.   She will continue the B12 injections as per plan. She agreed with the plan of her care. Her fatigue and other sympotoms as mentioned above are due to iron deficiency. She will f/u again with repeat labs in six weeks after the iron infusions. Signed By: Viri Jerry MD     October 11, 2018      Ms. Watson has a reminder for a \"due or due soon\" health maintenance. I have asked the patient to contact their primary care provider for follow-up on this health maintenance.

## 2018-10-11 NOTE — PROGRESS NOTES
Melania Lopez is a 35 y.o. female presenting today for a follow-up appointment r/t iron deficiency anemia and vitamin B12 deficiency. Patient is ambulatory with no assistive devices, is accompanied by her  Jaz Peace, and denies any complaints. Visit Vitals    /80 (BP 1 Location: Left arm, BP Patient Position: Sitting)    Pulse 74    Temp 97.3 °F (36.3 °C) (Oral)    Resp 16    Ht 5' 1\" (1.549 m)    Wt 123.6 kg (272 lb 6.4 oz)    LMP 10/07/2018 (Within Weeks)    SpO2 100%    BMI 51.47 kg/m2       Current Outpatient Prescriptions   Medication Sig    multivitamins chew Take  by mouth.  tiZANidine (ZANAFLEX) 4 mg tablet Take 1 Tab by mouth every six (6) hours as needed.  ferrous sulfate (IRON) 325 mg (65 mg iron) EC tablet Take 1 Tab by mouth Daily (before breakfast).  multivit with min-folic acid (ADULT MULTIVITAMIN GUMMIES) 200 mcg chew Take 2 Gum by mouth daily.  pantoprazole (PROTONIX) 40 mg tablet Take 1 Tab by mouth daily.  docusate sodium (COLACE) 100 mg capsule Take 1 Cap by mouth two (2) times a day.  OTHER Check CBC, CMP, Mg in 3 days, results to PCP immediately, diagnosis- Anemia    OTHER Incentive spirometry- use as directed    OTHER Graded Compression Stockings b/l LE- use as directed    OTHER Physical Therapy- Evaluate and Treat    OTHER Multivitamin- Patient states she was on a gummy vitamin recommended by her bariatric surgeon and I have advised her to resume that as directed    nicotine (NICODERM CQ) 7 mg/24 hr 1 Patch by TransDERmal route every twenty-four (24) hours.  OTHER This is to certify that Rajni Hester was admitted to [facility] on 2/2/18 and discharged on 2/4/18 , and has been advised to take rest at home for 7 ( seven ) more days and then resume work if symptom free. No current facility-administered medications for this visit.         Medications no longer taking/discontinued: colace, adult multivitamin gummies, nicoderm cq patch, protonix    1. Have you been to the ER, urgent care clinic since your last visit? Hospitalized since your last visit? No    2. Have you seen or consulted any other health care providers outside of the [address] [place] [address] since your last visit? Include any pap smears or colon screening.  No

## 2018-10-11 NOTE — MR AVS SNAPSHOT
[address] [facility] 
 
 
 [address] [facility] [address] [id number] [phone] Patient: Allyson George MRN: [id number] :1985 Visit Information Date & Time Provider Department Dept. Phone Encounter #  
 10/11/2018  9:30 AM Geraldine Pinto  [facility] [phone] [id number] Follow-up Instructions Return in about 6 weeks (around 2018) for f/u in six weeks blood tests one week before she sees me. Your Appointments 2018  9:30 AM  
Follow Up with Geraldine Pinto MD  
[facility] [address] [facility]) Appt Note: 6 week f/u anemia [address] [address] [facility]  
  
   
 [address] [facility] [address] [place] [address] [address] Upcoming Health Maintenance Date Due Pneumococcal 19-64 Medium Risk (1 of 1 - PPSV23) 2004 DTaP/Tdap/Td series (1 - Tdap) 2006 PAP AKA CERVICAL CYTOLOGY 2006 Influenza Age 5 to Adult 2018 Allergies as of 10/11/2018  Review Complete On: 10/11/2018 By: Geraldine Pinto MD  
 No Known Allergies Current Immunizations  Never Reviewed No immunizations on file. Not reviewed this visit You Were Diagnosed With   
  
 Codes Comments Anemia, unspecified type    -  Primary ICD-10-CM: D64.9 ICD-9-CM: 522. 9 Vitals OB Status Smoking Status Unknown Current Some Day Smoker Preferred Pharmacy Pharmacy Name Phone [id number] [address]eline [address] [phone] Your Updated Medication List  
  
   
This list is accurate as of 10/11/18 10:09 AM.  Always use your most recent med list.  
  
  
  
  
 docusate sodium 100 mg capsule Commonly known as:  Tomma Markus Take 1 Cap by mouth two (2) times a day. ferrous sulfate 325 mg (65 mg iron) EC tablet Commonly known as:  IRON  
 Take 1 Tab by mouth Daily (before breakfast). multivit with min-folic acid 862 mcg Chew Commonly known as:  ADULT MULTIVITAMIN GUMMIES Take 2 Gum by mouth daily. nicotine 7 mg/24 hr  
Commonly known as:  NICODERM CQ  
1 Patch by TransDERmal route every twenty-four (24) hours. OTHER Check CBC, CMP, Mg in 3 days, results to PCP immediately, diagnosis- Anemia OTHER Incentive spirometry- use as directed OTHER  
Graded Compression Stockings b/l LE- use as directed OTHER Physical Therapy- Evaluate and Treat OTHER Multivitamin- Patient states she was on a gummy vitamin recommended by her bariatric surgeon and I have advised her to resume that as directed OTHER This is to certify that Nelson Morgan was admitted to [facility] on 2/2/18 and discharged on 2/4/18 , and has been advised to take rest at home for 7 ( seven ) more days and then resume work if symptom free. pantoprazole 40 mg tablet Commonly known as:  PROTONIX Take 1 Tab by mouth daily. tiZANidine 4 mg tablet Commonly known as:  Dung Lee Take 1 Tab by mouth every six (6) hours as needed. Follow-up Instructions Return in about 6 weeks (around 11/22/2018) for f/u in six weeks blood tests one week before she sees me. To-Do List   
 10/11/2018 Lab:  CBC WITH AUTOMATED DIFF   
  
 10/11/2018 Lab:  FERRITIN   
  
 10/11/2018 Lab:  FOLATE   
  
 10/11/2018 Lab:  IRON PROFILE   
  
 10/11/2018 Lab:  METABOLIC PANEL, COMPREHENSIVE   
  
 10/11/2018 Lab:  VITAMIN B12 Introducing \[facility] [place] [facility]\"" & HEALTH SERVICES! Dear Benoit Lopezr: Thank you for requesting a [employer]ook account. Our records indicate that you already have an active [employer]ook account. You can access your account anytime at [email or web address] Did you know that you can access your hospital and ER discharge instructions at any time in Zevia? You can also review all of your test results from your hospital stay or ER visit. Additional Information If you have questions, please visit the Frequently Asked Questions section of the [employer]evia website at [email or web address] Remember, Zevia is NOT to be used for urgent needs. For medical emergencies, dial 911. Now available from your iPhone and Android! Please provide this summary of care documentation to your next provider. Your primary care clinician is listed as Deatra Fillers. If you have any questions after today's visit, please call [phone].

## 2018-10-18 ENCOUNTER — HOSPITAL ENCOUNTER (OUTPATIENT)
Dept: INFUSION THERAPY | Age: 33
Discharge: HOME | End: 2018-10-18
Payer: COMMERCIAL

## 2018-10-18 VITALS
HEART RATE: 85 BPM | TEMPERATURE: 98.1 F | OXYGEN SATURATION: 99 % | DIASTOLIC BLOOD PRESSURE: 87 MMHG | SYSTOLIC BLOOD PRESSURE: 130 MMHG | RESPIRATION RATE: 18 BRPM

## 2018-10-18 PROCEDURE — 74011250636 HC RX REV CODE- 250/636: Performed by: INTERNAL MEDICINE

## 2018-10-18 PROCEDURE — 96365 THER/PROPH/DIAG IV INF INIT: CPT

## 2018-10-18 RX ADMIN — FERRIC CARBOXYMALTOSE INJECTION 750 MG: 50 INJECTION, SOLUTION INTRAVENOUS at 14:42

## 2018-10-18 NOTE — PROGRESS NOTES
[facility] OPI Progress Note Date: 2018 Name: Kristine Go MRN: [id number] : 1985 Injectafer Ms. Watson was assessed and education was provided. Ms. Devorah Pimentel vitals were reviewed and patient was observed for 5 minutes prior to treatment. Visit Vitals /87 (BP 1 Location: Right arm, BP Patient Position: Sitting) Pulse 85 Temp 98.1 °F (36.7 °C) Resp 18 SpO2 99% Lab results were obtained and reviewed. No results found for this or any previous visit (from the past 12 hour(s)). Saline lock started to right arm x1 attempt using 24g catheter, line flushes briskly. Injectafer 750 mg was infused over 15 mins. After infusion complete line flushed with 5 ml normal saline then removed. Gauze and coban applied to site. Ms. Cristina Vaughn tolerated the infusion, and had no complaints. Patient armband removed and shredded. Ms. Cristina Vaughn was discharged from [place] [address] in stable condition at 1510. She is to return on 18 at 0900 for her next lab appointment. Carina Toledo RN 2018 
3:38 PM

## 2018-10-25 ENCOUNTER — HOSPITAL ENCOUNTER (OUTPATIENT)
Dept: INFUSION THERAPY | Age: 33
End: 2018-10-25
Payer: COMMERCIAL

## 2018-10-29 ENCOUNTER — HOSPITAL ENCOUNTER (OUTPATIENT)
Dept: INFUSION THERAPY | Age: 33
Discharge: HOME | End: 2018-10-29
Payer: COMMERCIAL

## 2018-10-29 VITALS
DIASTOLIC BLOOD PRESSURE: 60 MMHG | TEMPERATURE: 98.6 F | SYSTOLIC BLOOD PRESSURE: 125 MMHG | HEART RATE: 95 BPM | RESPIRATION RATE: 18 BRPM | OXYGEN SATURATION: 99 %

## 2018-10-29 PROCEDURE — 96374 THER/PROPH/DIAG INJ IV PUSH: CPT

## 2018-10-29 PROCEDURE — 74011250636 HC RX REV CODE- 250/636: Performed by: INTERNAL MEDICINE

## 2018-10-29 RX ORDER — SODIUM CHLORIDE 0.9 % (FLUSH) 0.9 %
10-40 SYRINGE (ML) INJECTION EVERY 8 HOURS
Status: DISCONTINUED | OUTPATIENT
Start: 2018-10-29 | End: 2018-11-02 | Stop reason: HOSPADM

## 2018-10-29 RX ADMIN — Medication 20 ML: at 10:18

## 2018-10-29 RX ADMIN — FERRIC CARBOXYMALTOSE INJECTION 750 MG: 50 INJECTION, SOLUTION INTRAVENOUS at 10:02

## 2018-10-29 NOTE — PROGRESS NOTES
[facility] Progress Note Date: 2018 Name: Yvette Calhoun MRN: [id number] : 1985 Injectafer Ms. Watson was assessed and education was provided. Ms. Ally Salvador vitals were reviewed and patient was observed for 5 minutes prior to treatment. Visit Vitals /60 (BP 1 Location: Left arm, BP Patient Position: Sitting) Pulse 95 Temp 98.6 °F (37 °C) Resp 18 SpO2 99% Saline lock started to right arm x1 attempt using 22g catheter by REAL Burk, line flushes briskly. Injectafer 750 mg was infused over 10 mins. After infusion complete line flushed with 10 ml normal saline then removed. Gauze and coban applied to site. Ms. Preet Mcknight tolerated the infusion, and had no complaints. Patient armband removed and shredded. Ms. Preet Mcknight was discharged from [place] [address] in stable condition at 1020. She is to return on 18 at 0900 for her next lab appointment. Christine Torre RN 2018 
3:38 PM

## 2018-11-21 ENCOUNTER — TELEPHONE (OUTPATIENT)
Dept: ONCOLOGY | Age: 33
End: 2018-11-21

## 2018-11-29 ENCOUNTER — TELEPHONE (OUTPATIENT)
Dept: ONCOLOGY | Age: 33
End: 2018-11-29

## 2018-11-30 ENCOUNTER — HOSPITAL ENCOUNTER (OUTPATIENT)
Dept: INFUSION THERAPY | Age: 33
Discharge: HOME | End: 2018-11-30
Payer: COMMERCIAL

## 2018-11-30 ENCOUNTER — OFFICE VISIT (OUTPATIENT)
Dept: ONCOLOGY | Age: 33
End: 2018-11-30

## 2018-11-30 VITALS
BODY MASS INDEX: 53.05 KG/M2 | HEIGHT: 61 IN | TEMPERATURE: 97.3 F | OXYGEN SATURATION: 100 % | SYSTOLIC BLOOD PRESSURE: 124 MMHG | RESPIRATION RATE: 16 BRPM | HEART RATE: 77 BPM | WEIGHT: 281 LBS | DIASTOLIC BLOOD PRESSURE: 91 MMHG

## 2018-11-30 VITALS
SYSTOLIC BLOOD PRESSURE: 104 MMHG | TEMPERATURE: 97.6 F | RESPIRATION RATE: 18 BRPM | HEART RATE: 79 BPM | DIASTOLIC BLOOD PRESSURE: 81 MMHG

## 2018-11-30 DIAGNOSIS — D64.9 ANEMIA, UNSPECIFIED TYPE: Primary | ICD-10-CM

## 2018-11-30 LAB
ALBUMIN SERPL-MCNC: 3.6 G/DL (ref 3.4–5)
ALBUMIN/GLOB SERPL: 1 {RATIO} (ref 0.8–1.7)
ALP SERPL-CCNC: 97 U/L (ref 45–117)
ALT SERPL-CCNC: 32 U/L (ref 13–56)
ANION GAP SERPL CALC-SCNC: 4 MMOL/L (ref 3–18)
AST SERPL-CCNC: 16 U/L (ref 15–37)
BASO+EOS+MONOS # BLD AUTO: 0.3 K/UL (ref 0–2.3)
BASO+EOS+MONOS # BLD AUTO: 6 % (ref 0.1–17)
BILIRUB SERPL-MCNC: 0.6 MG/DL (ref 0.2–1)
BUN SERPL-MCNC: 10 MG/DL (ref 7–18)
BUN/CREAT SERPL: 17 (ref 12–20)
CALCIUM SERPL-MCNC: 8.6 MG/DL (ref 8.5–10.1)
CHLORIDE SERPL-SCNC: 111 MMOL/L (ref 100–108)
CO2 SERPL-SCNC: 26 MMOL/L (ref 21–32)
CREAT SERPL-MCNC: 0.58 MG/DL (ref 0.6–1.3)
DIFFERENTIAL METHOD BLD: ABNORMAL
ERYTHROCYTE [DISTWIDTH] IN BLOOD BY AUTOMATED COUNT: 15 % (ref 11.5–14.5)
FERRITIN SERPL-MCNC: 205 NG/ML (ref 8–388)
FOLATE SERPL-MCNC: 7.9 NG/ML (ref 3.1–17.5)
GLOBULIN SER CALC-MCNC: 3.6 G/DL (ref 2–4)
GLUCOSE SERPL-MCNC: 95 MG/DL (ref 74–99)
HCT VFR BLD AUTO: 43.9 % (ref 36–48)
HGB BLD-MCNC: 14.1 G/DL (ref 12–16)
IRON SATN MFR SERPL: 45 %
IRON SERPL-MCNC: 125 UG/DL (ref 50–175)
LYMPHOCYTES # BLD: 1.9 K/UL (ref 1.1–5.9)
LYMPHOCYTES NFR BLD: 36 % (ref 14–44)
MCH RBC QN AUTO: 29.1 PG (ref 25–35)
MCHC RBC AUTO-ENTMCNC: 32.1 G/DL (ref 31–37)
MCV RBC AUTO: 90.5 FL (ref 78–102)
NEUTS SEG # BLD: 3.1 K/UL (ref 1.8–9.5)
NEUTS SEG NFR BLD: 58 % (ref 40–70)
PLATELET # BLD AUTO: 158 K/UL (ref 140–440)
POTASSIUM SERPL-SCNC: 4.3 MMOL/L (ref 3.5–5.5)
PROT SERPL-MCNC: 7.2 G/DL (ref 6.4–8.2)
RBC # BLD AUTO: 4.85 M/UL (ref 4.1–5.1)
SODIUM SERPL-SCNC: 141 MMOL/L (ref 136–145)
TIBC SERPL-MCNC: 276 UG/DL (ref 250–450)
VIT B12 SERPL-MCNC: 414 PG/ML (ref 211–911)
WBC # BLD AUTO: 5.3 K/UL (ref 4.5–13)

## 2018-11-30 PROCEDURE — 85025 COMPLETE CBC W/AUTO DIFF WBC: CPT

## 2018-11-30 PROCEDURE — 82728 ASSAY OF FERRITIN: CPT

## 2018-11-30 PROCEDURE — 82746 ASSAY OF FOLIC ACID SERUM: CPT

## 2018-11-30 PROCEDURE — 80053 COMPREHEN METABOLIC PANEL: CPT

## 2018-11-30 PROCEDURE — 82607 VITAMIN B-12: CPT

## 2018-11-30 PROCEDURE — 83540 ASSAY OF IRON: CPT

## 2018-11-30 PROCEDURE — 36415 COLL VENOUS BLD VENIPUNCTURE: CPT

## 2018-11-30 NOTE — PROGRESS NOTES
Oncology Note    Subjective:     Jennifer Richardson is a 35 y.o. AAF  has a known hx of gastric bypass, has iron and b12 deficiency. She has received Iron infusion in the past and is getting b12 injection. She had been advised to undergo blood tests three days before but did not do so. She had undergone blood draw at the office today to assess her response to iron treatment. She says she feels better now. She denies any new complaints. Patient Active Problem List    Diagnosis Date Noted    Obesity, morbid ([address] 10/08/2018    Class 3 obesity with serious comorbidity and body mass index (BMI) of 45.0 to 49.9 in adult 2018    H/O gastric bypass 2018    Anemia 2018    Microcytic hypochromic anemia 2018     Past Medical History:   Diagnosis Date    Anemia NEC     Asthma      delivery     Gallstones     Gastrointestinal disorder       Past Surgical History:   Procedure Laterality Date    HX  SECTION      HX GASTRIC BYPASS      HX ORTHOPAEDIC      correction of bow legs      Current Outpatient Medications   Medication Sig    multivit with min-folic acid (ADULT MULTIVITAMIN GUMMIES) 200 mcg chew Take 2 Gum by mouth daily.  tiZANidine (ZANAFLEX) 4 mg tablet Take 1 Tab by mouth every six (6) hours as needed.  pantoprazole (PROTONIX) 40 mg tablet Take 1 Tab by mouth daily.  ferrous sulfate (IRON) 325 mg (65 mg iron) EC tablet Take 1 Tab by mouth Daily (before breakfast).  docusate sodium (COLACE) 100 mg capsule Take 1 Cap by mouth two (2) times a day.     OTHER Check CBC, CMP, Mg in 3 days, results to PCP immediately, diagnosis- Anemia    OTHER Incentive spirometry- use as directed    OTHER Graded Compression Stockings b/l LE- use as directed    OTHER Physical Therapy- Evaluate and Treat    OTHER Multivitamin- Patient states she was on a gummy vitamin recommended by her bariatric surgeon and I have advised her to resume that as directed    nicotine (NICODERM CQ) 7 mg/24 hr 1 Patch by TransDERmal route every twenty-four (24) hours.  OTHER This is to certify that Dahlia Fan was admitted to [facility] on 2/2/18 and discharged on 2/4/18 , and has been advised to take rest at home for 7 ( seven ) more days and then resume work if symptom free. No current facility-administered medications for this visit. No Known Allergies   Social History     Tobacco Use    Smoking status: Current Some Day Smoker     Packs/day: 0.25    Smokeless tobacco: Never Used    Tobacco comment: 5 or 6 a day, \"trying to quit\"   Substance Use Topics    Alcohol use: Yes     Comment: occational   liquor drinker      Family History   Problem Relation Age of Onset    Hypertension Mother     Asthma Father         Review of Systems:  Constitutional No fevers, chills, night sweats, excessive fatigue or weight loss. Allergic/Immunologic No recent allergic reactions   Eyes No significant visual difficulties. No diplopia. ENMT No problems with hearing, no sore throat, no sinus drainage. Endocrine No hot flashes or night sweats. No cold intolerance, polyuria, or polydipsia   Hematologic/Lymphatic No easy bruising or bleeding. The patient denies any tender or palpable lymph nodes   Breasts No abnormal masses of breast, nipple discharge or pain. Respiratory No dyspnea on exertion, orthopnea, chest pain, cough or hemoptysis. Cardiovascular No anginal chest pain, irregular heart beat, tachycardia, palpitations or orthopnea. Gastrointestinal No nausea, vomiting, diarrhea, constipation, cramping, dysphagia, reflux, heartburn, GI bleeding, or early satiety. No change in bowel habits. Genitourinary (M) No hematuria, dysuria, increased frequency, urgency, hesitancy or incontinence. Musculoskeletal No joint pain, swelling or redness. No decreased range of motion.    Integumentary No chronic rashes, inflammation, ulcerations, pruritus, petechiae, purpura, ecchymoses, or skin changes. Neurologic No headache, blurred vision, and no areas of focal weakness or numbness. Normal gait. No sensory problems. Psychiatric No insomnia, depression, marion or mood swings. No psychotropic drugs. Objective:       Physical Exam:   Visit Vitals  BP (!) 124/91 (BP 1 Location: Right arm, BP Patient Position: Sitting)   Pulse 77   Temp 97.3 °F (36.3 °C) (Oral)   Resp 16   Ht 5' 1\" (1.549 m)   Wt 127.5 kg (281 lb)   LMP 11/11/2018 (Within Days)   SpO2 100%   BMI 53.09 kg/m²     General:  Alert, cooperative, no distress, appears stated age. Head:  Normocephalic, without obvious abnormality, atraumatic. Eyes:  Conjunctivae/corneas clear. PERRL, EOMs intact. Fundi benign. Ears:  Normal TMs and external ear canals both ears. Nose: Nares normal. Septum midline. Mucosa normal. No drainage or sinus tenderness. Throat: Lips, mucosa, and tongue normal. Teeth and gums normal.   Neck: Supple, symmetrical, trachea midline, no adenopathy, thyroid: no enlargement/tenderness/nodules, no carotid bruit and no JVD. Back:   Symmetric, no curvature. ROM normal. No CVA tenderness. Lungs:   Clear to auscultation bilaterally. Chest wall:  No tenderness or deformity. Heart:  Regular rate and rhythm, S1, S2 normal, no murmur, click, rub or gallop. Breast Exam:  No tenderness, masses, or nipple abnormality. Abdomen:   Soft, non-tender. Bowel sounds normal. No masses,  No organomegaly. Extremities: Extremities normal, atraumatic, no cyanosis or edema. Pulses: 2+ and symmetric all extremities. Skin: Skin color, texture, turgor normal. No rashes or lesions. Lymph nodes: Cervical, supraclavicular, and axillary nodes normal.   Neurologic: CNII-XII intact. Normal strength, sensation and reflexes throughout.        Labs:    Recent Results (from the past 24 hour(s))   CBC WITH 3 PART DIFF    Collection Time: 11/30/18  8:56 AM   Result Value Ref Range    WBC 5.3 4.5 - 13.0 K/uL    RBC 4.85 4.10 - 5.10 M/uL    HGB 14.1 12.0 - 16.0 g/dL    HCT 43.9 36 - 48 %    MCV 90.5 78 - 102 FL    MCH 29.1 25.0 - 35.0 PG    MCHC 32.1 31 - 37 g/dL    RDW 15.0 (H) 11.5 - 14.5 %    PLATELET 535 927 - 096 K/uL    NEUTROPHILS 58 40 - 70 %    MIXED CELLS 6 0.1 - 17 %    LYMPHOCYTES 36 14 - 44 %    ABS. NEUTROPHILS 3.1 1.8 - 9.5 K/UL    ABS. MIXED CELLS 0.3 0.0 - 2.3 K/uL    ABS. LYMPHOCYTES 1.9 1.1 - 5.9 K/UL    DF AUTOMATED             Assessment:     Active Problems:    * No active hospital problems. *      Plan:     Iron and B12 deficiency secondary to Gastric Bypass: I have reviewed her cbc , but do not have results for other studies. Her hemoglobin has improved by one gram. I have suggested to her continue the B12 injections and f/u with repeat blood tests in six weeks. She agreed with the plan of care    Signed By: Pedro Grady MD     November 30, 2018      Ms. Watson has a reminder for a \"due or due soon\" health maintenance. I have asked the patient to contact their primary care provider for follow-up on this health maintenance.

## 2018-11-30 NOTE — PROGRESS NOTES
Rama Marshall is a 35 y.o. female presenting today for a follow-up r/t anemia. Patient is ambulatory with no assistive devices and denies any complaints. Chief Complaint   Patient presents with    Anemia     Follow-up       Visit Vitals  BP (!) 124/91 (BP 1 Location: Right arm, BP Patient Position: Sitting)   Pulse 77   Temp 97.3 °F (36.3 °C) (Oral)   Resp 16   Ht 5' 1\" (1.549 m)   Wt 127.5 kg (281 lb)   LMP 11/11/2018 (Within Days)   SpO2 100%   BMI 53.09 kg/m²       Current Outpatient Medications   Medication Sig    multivit with min-folic acid (ADULT MULTIVITAMIN GUMMIES) 200 mcg chew Take 2 Gum by mouth daily.  tiZANidine (ZANAFLEX) 4 mg tablet Take 1 Tab by mouth every six (6) hours as needed.  pantoprazole (PROTONIX) 40 mg tablet Take 1 Tab by mouth daily.  ferrous sulfate (IRON) 325 mg (65 mg iron) EC tablet Take 1 Tab by mouth Daily (before breakfast).  docusate sodium (COLACE) 100 mg capsule Take 1 Cap by mouth two (2) times a day.  OTHER Check CBC, CMP, Mg in 3 days, results to PCP immediately, diagnosis- Anemia    OTHER Incentive spirometry- use as directed    OTHER Graded Compression Stockings b/l LE- use as directed    OTHER Physical Therapy- Evaluate and Treat    OTHER Multivitamin- Patient states she was on a gummy vitamin recommended by her bariatric surgeon and I have advised her to resume that as directed    nicotine (NICODERM CQ) 7 mg/24 hr 1 Patch by TransDERmal route every twenty-four (24) hours.  OTHER This is to certify that Leonor Harris was admitted to [facility] on 2/2/18 and discharged on 2/4/18 , and has been advised to take rest at home for 7 ( seven ) more days and then resume work if symptom free. No current facility-administered medications for this visit. Medications no longer taking/discontinued: patient is only taking multivitamin chew    No flowsheet data found.     PHQ over the last two weeks 2/6/2018   Little interest or pleasure in doing things Not at all   Feeling down, depressed, irritable, or hopeless Not at all   Total Score PHQ 2 0       No flowsheet data found. 1. Have you been to the ER, urgent care clinic since your last visit? Hospitalized since your last visit? No    2. Have you seen or consulted any other health care providers outside of the [address] [place] [address] since your last visit? Include any pap smears or colon screening.  No

## 2018-11-30 NOTE — PROGRESS NOTES
[facility] Progress Note Date: 2018 Name: Zoya Pelayo MRN: [id number] : 1985 Peripheral Lab Draw Ms. Watson to [facility], ambulatory at 7578. Pt was assessed and education was provided. Ms. Anjel Spring vitals were reviewed and patient was observed for 5 minutes prior to treatment. Visit Vitals /81 (BP 1 Location: Right arm, BP Patient Position: Sitting) Pulse 79 Temp 97.6 °F (36.4 °C) Resp 18 Blood obtained peripherally from right arm x 1 attempt with butterfly needle and sent to lab for cbc, cmp, vitamin b12, ferritin, folate, iron profile per written orders. No bleeding or hematoma noted at site. Guaze and coban applied. Ms. Mihaela Campbell tolerated the phlebotomy, and had no complaints. Patient armband removed and shredded. Patient was informed of the privacy risks if armband lost or stolen. Ms. Mihaela Campbell was discharged from [place] [address] in stable condition at 0900. She is to follow up with her doctor for her next appointment. Claudy Alarcon 2018 
9:09 AM

## 2019-01-24 PROBLEM — E53.8 B12 DEFICIENCY: Status: ACTIVE | Noted: 2019-01-24

## 2019-02-23 ENCOUNTER — HOSPITAL ENCOUNTER (EMERGENCY)
Age: 34
Discharge: HOME | End: 2019-02-23
Attending: EMERGENCY MEDICINE
Payer: COMMERCIAL

## 2019-02-23 VITALS
RESPIRATION RATE: 18 BRPM | BODY MASS INDEX: 46.01 KG/M2 | DIASTOLIC BLOOD PRESSURE: 49 MMHG | OXYGEN SATURATION: 99 % | HEART RATE: 74 BPM | WEIGHT: 250 LBS | HEIGHT: 62 IN | TEMPERATURE: 98.1 F | SYSTOLIC BLOOD PRESSURE: 119 MMHG

## 2019-02-23 DIAGNOSIS — R68.83 CHILLS: Primary | ICD-10-CM

## 2019-02-23 LAB
APPEARANCE UR: CLEAR
BACTERIA URNS QL MICRO: ABNORMAL /HPF
BILIRUB UR QL: NEGATIVE
COLOR UR: YELLOW
EPITH CASTS URNS QL MICRO: ABNORMAL /LPF (ref 0–5)
ERYTHROCYTE [DISTWIDTH] IN BLOOD BY AUTOMATED COUNT: 13.7 % (ref 11.6–14.5)
GLUCOSE UR STRIP.AUTO-MCNC: NEGATIVE MG/DL
HCG UR QL: NEGATIVE
HCT VFR BLD AUTO: 42.9 % (ref 35–45)
HGB BLD-MCNC: 14.1 G/DL (ref 12–16)
HGB UR QL STRIP: ABNORMAL
KETONES UR QL STRIP.AUTO: NEGATIVE MG/DL
LEUKOCYTE ESTERASE UR QL STRIP.AUTO: NEGATIVE
MCH RBC QN AUTO: 29.8 PG (ref 24–34)
MCHC RBC AUTO-ENTMCNC: 32.9 G/DL (ref 31–37)
MCV RBC AUTO: 90.7 FL (ref 74–97)
NITRITE UR QL STRIP.AUTO: NEGATIVE
PH UR STRIP: 5 [PH] (ref 5–8)
PLATELET # BLD AUTO: 204 K/UL (ref 135–420)
PMV BLD AUTO: 10.9 FL (ref 9.2–11.8)
PROT UR STRIP-MCNC: NEGATIVE MG/DL
RBC # BLD AUTO: 4.73 M/UL (ref 4.2–5.3)
RBC #/AREA URNS HPF: ABNORMAL /HPF (ref 0–5)
SP GR UR REFRACTOMETRY: 1.01 (ref 1–1.03)
UROBILINOGEN UR QL STRIP.AUTO: 1 EU/DL (ref 0.2–1)
WBC # BLD AUTO: 7.3 K/UL (ref 4.6–13.2)
WBC URNS QL MICRO: ABNORMAL /HPF (ref 0–4)

## 2019-02-23 PROCEDURE — 99283 EMERGENCY DEPT VISIT LOW MDM: CPT

## 2019-02-23 PROCEDURE — 81001 URINALYSIS AUTO W/SCOPE: CPT

## 2019-02-23 PROCEDURE — 81025 URINE PREGNANCY TEST: CPT

## 2019-02-23 PROCEDURE — 85027 COMPLETE CBC AUTOMATED: CPT

## 2019-02-24 NOTE — ED PROVIDER NOTES
Vladislav Dasilva is a 35 y.o. Female with a history of anemia presents to the ED c/o \"suddenly getting extremely cold\" onset tonight. She is worried that she is anemic. Her last blood transfusion was a month ago. Denies any urinary problems, n/v/d, or fever. Denies cough, cold, sore throat. No other complaints at this time. Past Medical History:  
Diagnosis Date  Anemia NEC  Asthma   delivery  Gallstones  Gastrointestinal disorder Past Surgical History:  
Procedure Laterality Date  HX  SECTION    
 HX GASTRIC BYPASS  2007  HX ORTHOPAEDIC    
 correction of bow legs Family History:  
Problem Relation Age of Onset  Hypertension Mother  Asthma Father Social History Socioeconomic History  Marital status: SINGLE Spouse name: Not on file  Number of children: Not on file  Years of education: Not on file  Highest education level: Not on file Social Needs  Financial resource strain: Not on file  Food insecurity - worry: Not on file  Food insecurity - inability: Not on file  Transportation needs - medical: Not on file  Transportation needs - non-medical: Not on file Occupational History  Not on file Tobacco Use  Smoking status: Current Some Day Smoker Packs/day: 0.25  Smokeless tobacco: Never Used  Tobacco comment: 5 or 6 a day, \"trying to quit\" Substance and Sexual Activity  Alcohol use: Yes Comment: occational   liquor drinker  Drug use: No  
 Sexual activity: Yes  
  Partners: Male Birth control/protection: Condom Other Topics Concern  Not on file Social History Narrative  Not on file ALLERGIES: Patient has no known allergies. Review of Systems Constitutional: Positive for chills. Negative for fever. HENT: Negative. Negative for congestion, rhinorrhea and sore throat. Eyes: Negative. Respiratory: Negative. Negative for cough and shortness of breath. Cardiovascular: Negative. Negative for chest pain and leg swelling. Gastrointestinal: Negative. Negative for abdominal pain, diarrhea and vomiting. Genitourinary: Negative. Negative for dysuria and vaginal discharge. Musculoskeletal: Negative. Negative for back pain and myalgias. Skin: Negative. Negative for wound. Neurological: Negative. Negative for dizziness, weakness and light-headedness. Psychiatric/Behavioral: Negative. All other systems reviewed and are negative. Vitals:  
 02/23/19 2116 BP: 119/49 Pulse: 74 Resp: 18 Temp: 98.1 °F (36.7 °C) SpO2: 99% Weight: 113.4 kg (250 lb) Height: 5' 2\" (1.575 m) Physical Exam  
Constitutional: She is oriented to person, place, and time. She appears well-developed and well-nourished. No distress. HENT:  
Head: Normocephalic and atraumatic. Mouth/Throat: Oropharynx is clear and moist.  
Eyes: Conjunctivae and EOM are normal. Pupils are equal, round, and reactive to light. Neck: Trachea normal and normal range of motion. Neck supple. No thyromegaly present. Cardiovascular: Normal rate, regular rhythm, S1 normal and S2 normal. Exam reveals no gallop and no friction rub. No murmur heard. Pulmonary/Chest: Effort normal and breath sounds normal. No accessory muscle usage. No respiratory distress. Abdominal: Soft. Normal appearance. She exhibits no distension. There is no rigidity and no rebound. Musculoskeletal: Normal range of motion. She exhibits no edema or tenderness. Lymphadenopathy:  
  She has no cervical adenopathy. Neurological: She is alert and oriented to person, place, and time. She has normal strength. Coordination normal.  
Skin: Skin is warm and intact. No rash noted. Psychiatric: She has a normal mood and affect. Her speech is normal and behavior is normal.  
Vitals reviewed.  
  
 
MDM 
 Number of Diagnoses or Management Options Chills:  
Diagnosis management comments: Sarkis Hogan is a 35 y.o. Female coming in with chills. Afebrile here and well appearing. No sx to suggest source of infection. Vitals all WNL, no other sx of symptomatic anemia and hbg 14. No evidence of UTI. Do not feel that she needs further emergent w/u here in the ED. Will refer to PCP and advised to return for worsening symptoms. Vitals: 
Patient Vitals for the past 12 hrs: 
 Temp Pulse Resp BP SpO2  
02/23/19 2116 98.1 °F (36.7 °C) 74 18 119/49 99 % Medications Ordered: 
Medications - No data to display Lab Findings: 
Recent Results (from the past 12 hour(s)) CBC W/O DIFF Collection Time: 02/23/19  9:52 PM  
Result Value Ref Range WBC 7.3 4.6 - 13.2 K/uL  
 RBC 4.73 4.20 - 5.30 M/uL  
 HGB 14.1 12.0 - 16.0 g/dL HCT 42.9 35.0 - 45.0 % MCV 90.7 74.0 - 97.0 FL  
 MCH 29.8 24.0 - 34.0 PG  
 MCHC 32.9 31.0 - 37.0 g/dL  
 RDW 13.7 11.6 - 14.5 % PLATELET 742 032 - 207 K/uL MPV 10.9 9.2 - 11.8 FL  
URINALYSIS W/ RFLX MICROSCOPIC Collection Time: 02/23/19  9:52 PM  
Result Value Ref Range Color YELLOW Appearance CLEAR Specific gravity 1.008 1.005 - 1.030    
 pH (UA) 5.0 5.0 - 8.0 Protein NEGATIVE  NEG mg/dL Glucose NEGATIVE  NEG mg/dL Ketone NEGATIVE  NEG mg/dL Bilirubin NEGATIVE  NEG Blood LARGE (A) NEG Urobilinogen 1.0 0.2 - 1.0 EU/dL Nitrites NEGATIVE  NEG Leukocyte Esterase NEGATIVE  NEG    
HCG URINE, QL Collection Time: 02/23/19  9:52 PM  
Result Value Ref Range HCG urine, QL NEGATIVE  NEG    
URINE MICROSCOPIC ONLY Collection Time: 02/23/19  9:52 PM  
Result Value Ref Range WBC 0 to 3 0 - 4 /hpf  
 RBC 4 to 10 0 - 5 /hpf Epithelial cells 1+ 0 - 5 /lpf Bacteria 1+ (A) NEG /hpf Diagnosis: 1. Chills Disposition: D/C Follow-up Information Follow up With Specialties Details Why Contact Info [employer]   As needed [place] 
[address] [facility] [address] 
[phone] [id number] [facility] EMERGENCY DEPT Emergency Medicine  If symptoms worsen [place] [id number] [phone] Medication List  
  
You have not been prescribed any medications. Scribe Attestation I-Vickie Olivares acting as a scribe for and in the presence of Alexander Jeans, MD     
February 23, 2019 at 9:47 PM 
    
Provider Attestation:     
I personally performed the services described in the documentation, reviewed the documentation, as recorded by the scribe in my presence, and it accurately and completely records my words and actions.  February 23, 2019 at 9:47 PM - Alexander Jeans, MD

## 2019-02-24 NOTE — ED NOTES
10:18 PM 
02/23/19 Discharge instructions given to Mimavelvet Faina (name) with verbalization of understanding. Patient accompanied by spouse. Patient discharged with the following prescriptions none. Patient discharged to home (destination). [facility]

## 2019-07-25 ENCOUNTER — TELEPHONE (OUTPATIENT)
Dept: ONCOLOGY | Age: 34
End: 2019-07-25

## 2019-07-31 ENCOUNTER — HOSPITAL ENCOUNTER (EMERGENCY)
Age: 34
Discharge: HOME | End: 2019-07-31
Attending: EMERGENCY MEDICINE | Admitting: EMERGENCY MEDICINE
Payer: MEDICAID

## 2019-07-31 VITALS
DIASTOLIC BLOOD PRESSURE: 80 MMHG | OXYGEN SATURATION: 98 % | TEMPERATURE: 97.5 F | SYSTOLIC BLOOD PRESSURE: 135 MMHG | HEART RATE: 62 BPM | RESPIRATION RATE: 12 BRPM

## 2019-07-31 DIAGNOSIS — D64.9 ANEMIA, UNSPECIFIED TYPE: Primary | ICD-10-CM

## 2019-07-31 LAB
ABO + RH BLD: NORMAL
ANION GAP SERPL CALC-SCNC: 6 MMOL/L (ref 3–18)
APPEARANCE UR: CLEAR
BASOPHILS # BLD: 0 K/UL (ref 0–0.1)
BASOPHILS NFR BLD: 0 % (ref 0–2)
BILIRUB UR QL: NEGATIVE
BLOOD GROUP ANTIBODIES SERPL: NORMAL
BUN SERPL-MCNC: 7 MG/DL (ref 7–18)
BUN/CREAT SERPL: 10 (ref 12–20)
CALCIUM SERPL-MCNC: 9 MG/DL (ref 8.5–10.1)
CHLORIDE SERPL-SCNC: 110 MMOL/L (ref 100–111)
CO2 SERPL-SCNC: 26 MMOL/L (ref 21–32)
COLOR UR: YELLOW
CREAT SERPL-MCNC: 0.67 MG/DL (ref 0.6–1.3)
DIFFERENTIAL METHOD BLD: ABNORMAL
EOSINOPHIL # BLD: 0.3 K/UL (ref 0–0.4)
EOSINOPHIL NFR BLD: 4 % (ref 0–5)
ERYTHROCYTE [DISTWIDTH] IN BLOOD BY AUTOMATED COUNT: 16.4 % (ref 11.6–14.5)
GLUCOSE SERPL-MCNC: 76 MG/DL (ref 74–99)
GLUCOSE UR STRIP.AUTO-MCNC: NEGATIVE MG/DL
HCG UR QL: NEGATIVE
HCT VFR BLD AUTO: 35.5 % (ref 35–45)
HGB BLD-MCNC: 11.5 G/DL (ref 12–16)
HGB UR QL STRIP: NEGATIVE
KETONES UR QL STRIP.AUTO: NEGATIVE MG/DL
LEUKOCYTE ESTERASE UR QL STRIP.AUTO: NEGATIVE
LYMPHOCYTES # BLD: 2.6 K/UL (ref 0.9–3.6)
LYMPHOCYTES NFR BLD: 40 % (ref 21–52)
MCH RBC QN AUTO: 24.2 PG (ref 24–34)
MCHC RBC AUTO-ENTMCNC: 32.4 G/DL (ref 31–37)
MCV RBC AUTO: 74.7 FL (ref 74–97)
MONOCYTES # BLD: 0.4 K/UL (ref 0.05–1.2)
MONOCYTES NFR BLD: 5 % (ref 3–10)
NEUTS SEG # BLD: 3.3 K/UL (ref 1.8–8)
NEUTS SEG NFR BLD: 51 % (ref 40–73)
NITRITE UR QL STRIP.AUTO: NEGATIVE
PH UR STRIP: 7 [PH] (ref 5–8)
PLATELET # BLD AUTO: 279 K/UL (ref 135–420)
PMV BLD AUTO: 10.6 FL (ref 9.2–11.8)
POTASSIUM SERPL-SCNC: 4.2 MMOL/L (ref 3.5–5.5)
PROT UR STRIP-MCNC: NEGATIVE MG/DL
RBC # BLD AUTO: 4.75 M/UL (ref 4.2–5.3)
SODIUM SERPL-SCNC: 142 MMOL/L (ref 136–145)
SP GR UR REFRACTOMETRY: 1.02 (ref 1–1.03)
SPECIMEN EXP DATE BLD: NORMAL
UROBILINOGEN UR QL STRIP.AUTO: 1 EU/DL (ref 0.2–1)
WBC # BLD AUTO: 6.4 K/UL (ref 4.6–13.2)

## 2019-07-31 PROCEDURE — 81025 URINE PREGNANCY TEST: CPT

## 2019-07-31 PROCEDURE — 81003 URINALYSIS AUTO W/O SCOPE: CPT

## 2019-07-31 PROCEDURE — 85025 COMPLETE CBC W/AUTO DIFF WBC: CPT

## 2019-07-31 PROCEDURE — 99282 EMERGENCY DEPT VISIT SF MDM: CPT

## 2019-07-31 PROCEDURE — 80048 BASIC METABOLIC PNL TOTAL CA: CPT

## 2019-07-31 PROCEDURE — 86900 BLOOD TYPING SEROLOGIC ABO: CPT

## 2019-07-31 NOTE — ED PROVIDER NOTES
EMERGENCY DEPARTMENT HISTORY AND PHYSICAL EXAM    2:47 PM  Date: 2019  Patient Name: Sagar Arnold    History of Presenting Illness     Chief Complaint   Patient presents with    Abnormal Lab Results        History Provided By: Patient    HPI: Sagar Arnold is a 29 y.o. female with history of anemia due to menorrhagia. Patient is here after her doctor called her with abnormal lab results about her hemoglobin being very low instructed her to go to the emergency room. Patient does not know what the level was. She has been reporting symptoms of anemia has not fatigue, shortness of breath, dizziness over the past month and she does have follow-up with hematology. No history of fever or chills. No urinary symptoms. Location:  Severity:  Timing/course: Onset/Duration:     PCP: Shellye Nageotte, NP    Past History     Past Medical History:  Past Medical History:   Diagnosis Date    Anemia NEC     Asthma      delivery     Gallstones     Gastrointestinal disorder        Past Surgical History:  Past Surgical History:   Procedure Laterality Date    HX  SECTION      HX GASTRIC BYPASS      HX ORTHOPAEDIC      correction of bow legs       Family History:  Family History   Problem Relation Age of Onset    Hypertension Mother     Asthma Father        Social History:  Social History     Tobacco Use    Smoking status: Current Some Day Smoker     Packs/day: 0.25    Smokeless tobacco: Never Used    Tobacco comment: 5 or 6 a day, \"trying to quit\"   Substance Use Topics    Alcohol use: Yes     Comment: occational   liquor drinker    Drug use: No       Allergies:  No Known Allergies    Review of Systems   Review of Systems   Constitutional: Positive for fatigue. Respiratory: Positive for shortness of breath. Neurological: Positive for dizziness. All other systems reviewed and are negative.        Physical Exam     Patient Vitals for the past 12 hrs:   Temp Pulse Resp BP SpO2   07/31/19 1300 97.5 °F (36.4 °C) 62 12 135/80 98 %       Physical Exam   Constitutional: She is oriented to person, place, and time. She appears well-developed and well-nourished. HENT:   Head: Normocephalic and atraumatic. Eyes: Conjunctivae and EOM are normal.   Neck: Normal range of motion. Neck supple. Cardiovascular: Normal rate and normal heart sounds. Pulmonary/Chest: Effort normal and breath sounds normal. No respiratory distress. Musculoskeletal: Normal range of motion. She exhibits no edema or deformity. Neurological: She is alert and oriented to person, place, and time. Skin: Skin is warm and dry. Psychiatric: She has a normal mood and affect. Her behavior is normal. Judgment and thought content normal.       Diagnostic Study Results     Labs -  Recent Results (from the past 12 hour(s))   HCG URINE, QL    Collection Time: 07/31/19  1:06 PM   Result Value Ref Range    HCG urine, QL NEGATIVE  NEG     URINALYSIS W/ RFLX MICROSCOPIC    Collection Time: 07/31/19  1:06 PM   Result Value Ref Range    Color YELLOW      Appearance CLEAR      Specific gravity 1.016 1.005 - 1.030      pH (UA) 7.0 5.0 - 8.0      Protein NEGATIVE  NEG mg/dL    Glucose NEGATIVE  NEG mg/dL    Ketone NEGATIVE  NEG mg/dL    Bilirubin NEGATIVE  NEG      Blood NEGATIVE  NEG      Urobilinogen 1.0 0.2 - 1.0 EU/dL    Nitrites NEGATIVE  NEG      Leukocyte Esterase NEGATIVE  NEG     CBC WITH AUTOMATED DIFF    Collection Time: 07/31/19  1:21 PM   Result Value Ref Range    WBC 6.4 4.6 - 13.2 K/uL    RBC 4.75 4.20 - 5.30 M/uL    HGB 11.5 (L) 12.0 - 16.0 g/dL    HCT 35.5 35.0 - 45.0 %    MCV 74.7 74.0 - 97.0 FL    MCH 24.2 24.0 - 34.0 PG    MCHC 32.4 31.0 - 37.0 g/dL    RDW 16.4 (H) 11.6 - 14.5 %    PLATELET 397 721 - 400 K/uL    MPV 10.6 9.2 - 11.8 FL    NEUTROPHILS 51 40 - 73 %    LYMPHOCYTES 40 21 - 52 %    MONOCYTES 5 3 - 10 %    EOSINOPHILS 4 0 - 5 %    BASOPHILS 0 0 - 2 %    ABS.  NEUTROPHILS 3.3 1.8 - 8.0 K/UL ABS. LYMPHOCYTES 2.6 0.9 - 3.6 K/UL    ABS. MONOCYTES 0.4 0.05 - 1.2 K/UL    ABS. EOSINOPHILS 0.3 0.0 - 0.4 K/UL    ABS. BASOPHILS 0.0 0.0 - 0.1 K/UL    DF AUTOMATED     METABOLIC PANEL, BASIC    Collection Time: 07/31/19  1:21 PM   Result Value Ref Range    Sodium 142 136 - 145 mmol/L    Potassium 4.2 3.5 - 5.5 mmol/L    Chloride 110 100 - 111 mmol/L    CO2 26 21 - 32 mmol/L    Anion gap 6 3.0 - 18 mmol/L    Glucose 76 74 - 99 mg/dL    BUN 7 7.0 - 18 MG/DL    Creatinine 0.67 0.6 - 1.3 MG/DL    BUN/Creatinine ratio 10 (L) 12 - 20      GFR est AA >60 >60 ml/min/1.73m2    GFR est non-AA >60 >60 ml/min/1.73m2    Calcium 9.0 8.5 - 10.1 MG/DL   TYPE & SCREEN    Collection Time: 07/31/19  1:21 PM   Result Value Ref Range    Crossmatch Expiration 08/03/2019     ABO/Rh(D) AB POSITIVE     Antibody screen NEG        Radiologic Studies -   No results found. Medical Decision Making     ED Course: Progress Notes, Reevaluation, and Consults:    2:47 PM Initial assessment performed. The patients presenting problems have been discussed, and they/their family are in agreement with the care plan formulated and outlined with them. I have encouraged them to ask questions as they arise throughout their visit. Provider Notes (Medical Decision Making): 70-year-old female sent in by her primary care doctor for severe anemia. Patient is definitely symptomatic for anemia but on labs that were ordered from triage her hemoglobin level is 11 point she is well-appearing and vitally stable. She has not currently having vaginal bleeding I discussed with the patient that her levels are not dangerously low and that does not require blood transfusion. She does have follow-up with her hematologist soon and discussed possible p.o. iron versus B12 as she was diagnosed with that before. patient asked to be discharged and verbalized understanding of the plan.     Procedures:     Critical Care Time:     Vital Signs-Reviewed the patient's vital signs. Reviewed pt's pulse ox reading. EKG: Interpreted by the EP. Time Interpreted:    Rate:    Rhythm: Normal Sinus Rhythm    Interpretation:   Comparison:     Records Reviewed: Nursing Notes (Time of Review: 2:47 PM)  -I am the first provider for this patient.  -I reviewed the vital signs, available nursing notes, past medical history, past surgical history, family history and social history. Current Outpatient Medications   Medication Sig Dispense Refill    ferrous sulfate (IRON PO) Take  by mouth. Clinical Impression     Clinical Impression: No diagnosis found. Disposition: DC      DISCHARGE NOTE:     Pt has been reexamined. Patient has no new complaints, changes, or physical findings. Care plan outlined and precautions discussed. Results of labs were reviewed with the patient. All medications were reviewed with the patient; will d/c home with return precautions. All of pt's questions and concerns were addressed. Patient was instructed and agrees to follow up with hematology and primary care doctor, as well as to return to the ED upon further deterioration. Patient is ready to go home. This note was dictated utilizing voice recognition software which may lead to typographical errors. I apologize in advance if the situation occurs. If questions arise please do not hesitate to contact me or call our department.     Kely Lopez MD  2:47 PM

## 2019-07-31 NOTE — ED TRIAGE NOTES
Pt states she saw her doctor last week due to heavy menses and had her blood drawn. She says today her doctor left a voicemail yesterday and stated her H&H was low and directed her to the ER. She states she feels dizzy and nauseous. Pt could possibly be pregnant.

## 2019-07-31 NOTE — DISCHARGE INSTRUCTIONS

## 2020-06-11 DIAGNOSIS — E61.1 IRON DEFICIENCY: Primary | ICD-10-CM

## 2020-12-15 ENCOUNTER — OFFICE VISIT (OUTPATIENT)
Age: 35
End: 2020-12-15
Payer: MEDICAID

## 2020-12-15 VITALS
WEIGHT: 293 LBS | DIASTOLIC BLOOD PRESSURE: 71 MMHG | RESPIRATION RATE: 20 BRPM | HEART RATE: 81 BPM | BODY MASS INDEX: 53.81 KG/M2 | OXYGEN SATURATION: 100 % | SYSTOLIC BLOOD PRESSURE: 112 MMHG

## 2020-12-15 DIAGNOSIS — D50.9 IRON DEFICIENCY ANEMIA, UNSPECIFIED IRON DEFICIENCY ANEMIA TYPE: ICD-10-CM

## 2020-12-15 DIAGNOSIS — D50.9 MICROCYTIC HYPOCHROMIC ANEMIA: Primary | ICD-10-CM

## 2020-12-15 DIAGNOSIS — E53.8 B12 DEFICIENCY: ICD-10-CM

## 2020-12-15 PROCEDURE — 99214 OFFICE O/P EST MOD 30 MIN: CPT | Performed by: INTERNAL MEDICINE

## 2020-12-15 NOTE — PROGRESS NOTES
Clem Buerger is a 28 y.o. 1985 female with past medical history of gastric bypass and subsequent iron deficiency anemia as well as B12 deficiency, was on B12 injection replacement, previously seen by Dr. Bryson Patel, recently went to [facility] and was found to have hemoglobin of 7 and was asked to come follow-up with us. Labs on 2020 showed WBC 8.7, H&H 7.3/28.7, MCV 57, RDW 26.1, peripheral blood smear showed target cells, microcytes, hypochromasia as well as spherocytes. Pathology review of peripheral blood smear commented:\"Microcytic/hypochromic RBCs with targets and cigar-shaped RBCs, suggestive of iron deficiency. \"She is s/p one unit of pRBCs on 20. I saw and examined patient today. She is awake alert oriented x3. On review of system she reports easy fatigue, and pagophagia. No other form of pica. Has heavy menses. Reports lightheadedness, and weakness. Also has tingling in lower back and down to both legs. No melena or bright red blood per rectum. No focal neurologic deficit. Also has SOB when ambulating. All other point of view of system have been reviewed and were negative. ECOG performance status 0. Independent with ADLs and IADLs.       Past Medical History:   Diagnosis Date    Anemia NEC     Asthma      delivery     Gallstones     Gastrointestinal disorder      Past Surgical History:   Procedure Laterality Date    HX  SECTION      HX GASTRIC BYPASS      HX ORTHOPAEDIC      correction of bow legs     Social History     Socioeconomic History    Marital status: SINGLE     Spouse name: Not on file    Number of children: Not on file    Years of education: Not on file    Highest education level: Not on file   Tobacco Use    Smoking status: Current Some Day Smoker     Packs/day: 0.25    Smokeless tobacco: Never Used    Tobacco comment: 5 or 6 a day, \"trying to quit\"   Substance and Sexual Activity    Alcohol use: Yes     Comment: occational liquor drinker    Drug use: No    Sexual activity: Yes     Partners: Male     Birth control/protection: Condom     Family History   Problem Relation Age of Onset    Hypertension Mother     Asthma Father        Current Outpatient Medications   Medication Sig Dispense Refill    ferrous sulfate (IRON PO) Take  by mouth. No Known Allergies    Review of Systems  Aqs per HPI      Objective:  Visit Vitals  /71   Pulse 81   Resp 20   Wt 133.4 kg (294 lb 3.2 oz)   SpO2 100%   BMI 53.81 kg/m²         Physical Exam:   General appearance - alert, well appearing, and in no distress-Obese  Mental status - alert, oriented to person, place, and time  EYE-IVETTE, EOMI  ENT-ENT exam normal, no neck nodes or sinus tenderness  Mouth - mucous membranes moist, pharynx normal without lesions  Neck - supple, no significant adenopathy   Chest - clear to auscultation, no wheezes, rales or rhonchi, symmetric air entry   Heart - normal rate and regular rhythm   Abdomen - soft, nontender, nondistended, no masses or organomegaly  Lymph- no adenopathy palpable  Ext-no pedal edema noted  Skin-Warm and dry. Neuro -alert, oriented, normal speech, no focal findings or movement disorder noted          Diagnostic Imaging     No results found for this or any previous visit. Results for orders placed during the hospital encounter of 01/22/18   XR SPINE LUMB 2 OR 3 V    Narrative Lumbar spine - 3 view(s)    History: Coccyx pain after fall one week ago    Comparison: None; correlation with KUB 4/12/2016 and abdominal radiograph  10/6/2012    Findings:    Mineralization is normal. Five nonrib-bearing lumbar appearing vertebral bodies. The vertebra are normal in height and alignment. The disc spaces are preserved. The pedicles are intact. There is no evidence of fracture or dislocation. 12 mm  ossicle at the tip of the coccyx appears well-corticated and is favored to be  chronic.  Amorphous radiopaque densities projecting at the left midabdomen are  similar to prior exams and are probably postsurgical related to prior gastric  bypass. Impression Impression:    No acute fracture or malalignment of the lumbar spine. 12 mm ossicle at the tip of the coccyx is favored to be chronic; however, a  minimally displaced subacute fracture would be difficult to entirely exclude  without priors for comparison. Recommend clinical correlation with point  tenderness on physical exam.    Thank you for this referral.       Results for orders placed during the hospital encounter of 02/09/18   CT HEAD W WO CONT    Narrative CT of the head with and without contrast    CT CODE: [id number]    HISTORY: Generalized head pain for one year, chronic dizziness and syncope  episodes    COMPARISON: None. TECHNIQUE: Helical axial scan to the head was performed from the skull base to  the vertex before and after uneventful nonionic IV contrast administration. All CT scans at this facility performed using dose optimization techniques as  appreciated to a performed exam, to include automated exposure control,  adjustment of the mA and or KU according to patient size (including appropriate  matching for site specific examination), or use of iterative reconstruction  technique. CONTRAST: cc Isovue-300. FINDINGS: The brain parenchyma, ventricles and calvarium appear unremarkable. There is no evidence of enhancing mass or abnormal enhancement identified. There is no acute intracranial hemorrhage or mass effect present. No skull  fracture or extra axial fluid collections seen. Visualized sinuses shows  moderate mucosal thickening in ethmoid and left sphenoid sinuses. Patent  bilateral mastoid air cells. Impression IMPRESSION:    No significant intracranial abnormality. Moderate sinus disease as above.     Thank you for your referral.        Lab Results  Lab Results   Component Value Date/Time    WBC 6.4 07/31/2019 01:21 PM    HGB 11.5 (L) 07/31/2019 01:21 PM    HCT 35.5 07/31/2019 01:21 PM    PLATELET 244 88/93/0567 01:21 PM    MCV 74.7 07/31/2019 01:21 PM       Lab Results   Component Value Date/Time    Sodium 142 07/31/2019 01:21 PM    Potassium 4.2 07/31/2019 01:21 PM    Chloride 110 07/31/2019 01:21 PM    CO2 26 07/31/2019 01:21 PM    Anion gap 6 07/31/2019 01:21 PM    Glucose 76 07/31/2019 01:21 PM    BUN 7 07/31/2019 01:21 PM    Creatinine 0.67 07/31/2019 01:21 PM    BUN/Creatinine ratio 10 (L) 07/31/2019 01:21 PM    GFR est AA >60 07/31/2019 01:21 PM    GFR est non-AA >60 07/31/2019 01:21 PM    Calcium 9.0 07/31/2019 01:21 PM    Alk. phosphatase 97 11/30/2018 08:56 AM    Protein, total 7.2 11/30/2018 08:56 AM    Albumin 3.6 11/30/2018 08:56 AM    Globulin 3.6 11/30/2018 08:56 AM    A-G Ratio 1.0 11/30/2018 08:56 AM    ALT (SGPT) 32 11/30/2018 08:56 AM   Follow-up with PCP for health maintenance  Assessment/Plan:    ICD-10-CM ICD-9-CM    1. Microcytic hypochromic anemia  D50.9 280.9 CBC WITH AUTOMATED DIFF      FERRITIN      IRON PROFILE      METABOLIC PANEL, COMPREHENSIVE      TSH AND FREE T4   2. Iron deficiency anemia, unspecified iron deficiency anemia type  D50.9 280.9 CBC WITH AUTOMATED DIFF      FERRITIN      IRON PROFILE      METABOLIC PANEL, COMPREHENSIVE      TSH AND FREE T4   3. B12 deficiency  E53.8 266.2 VITAMIN B12 & FOLATE     Orders Placed This Encounter    CBC WITH AUTOMATED DIFF     Standing Status:   Future     Standing Expiration Date:   12/16/2021    FERRITIN     Standing Status:   Future     Standing Expiration Date:   12/16/2021    IRON PROFILE     Standing Status:   Future     Standing Expiration Date:   06/21/8794    METABOLIC PANEL, COMPREHENSIVE     Standing Status:   Future     Standing Expiration Date:   12/16/2021    TSH+FREE T4     Standing Status:   Future     Standing Expiration Date:   12/16/2021    VITAMIN B12 & FOLATE     Standing Status:   Future     Standing Expiration Date:   12/16/2021   1.  Microcytic hypochromic anemia  I had a long discussion with patient today. I told her that her microcytic anemia is likely secondary from malabsorption from her gastric bypass surgery but also possibly from heavy menses. Plan is to recheck labs as below and replace as needed. - CBC WITH AUTOMATED DIFF; Future  - FERRITIN; Future  - IRON PROFILE; Future  - METABOLIC PANEL, COMPREHENSIVE; Future  - TSH AND FREE T4; Future    2. Iron deficiency anemia, unspecified iron deficiency anemia type  As above  - CBC WITH AUTOMATED DIFF; Future  - FERRITIN; Future  - IRON PROFILE; Future  - METABOLIC PANEL, COMPREHENSIVE; Future  - TSH AND FREE T4; Future  - Give Venoferx4    3. B12 deficiency  Did not have replacement for awhile  - VITAMIN B12 & FOLATE; Future    RTC 2 weeks virtual      call if any problems  There are no Patient Instructions on file for this visit.        Darshan Cook MD

## 2020-12-18 ENCOUNTER — TELEPHONE (OUTPATIENT)
Age: 35
End: 2020-12-18

## 2020-12-18 ENCOUNTER — HOSPITAL ENCOUNTER (OUTPATIENT)
Dept: INFUSION THERAPY | Age: 35
Discharge: HOME | End: 2020-12-18
Payer: MEDICAID

## 2020-12-18 DIAGNOSIS — D50.9 MICROCYTIC HYPOCHROMIC ANEMIA: ICD-10-CM

## 2020-12-18 DIAGNOSIS — D50.9 IRON DEFICIENCY ANEMIA, UNSPECIFIED IRON DEFICIENCY ANEMIA TYPE: ICD-10-CM

## 2020-12-18 DIAGNOSIS — E53.8 B12 DEFICIENCY: ICD-10-CM

## 2020-12-18 LAB
ALBUMIN SERPL-MCNC: 3.4 G/DL (ref 3.4–5)
ALBUMIN/GLOB SERPL: 0.7 {RATIO} (ref 0.8–1.7)
ALP SERPL-CCNC: 79 U/L (ref 45–117)
ALT SERPL-CCNC: 19 U/L (ref 13–56)
ANION GAP SERPL CALC-SCNC: 5 MMOL/L (ref 3–18)
AST SERPL-CCNC: 11 U/L (ref 10–38)
BASOPHILS # BLD: 0 K/UL (ref 0–0.1)
BASOPHILS NFR BLD: 0 % (ref 0–2)
BILIRUB SERPL-MCNC: 0.4 MG/DL (ref 0.2–1)
BUN SERPL-MCNC: 9 MG/DL (ref 7–18)
BUN/CREAT SERPL: 12 (ref 12–20)
CALCIUM SERPL-MCNC: 9.1 MG/DL (ref 8.5–10.1)
CHLORIDE SERPL-SCNC: 106 MMOL/L (ref 100–111)
CO2 SERPL-SCNC: 26 MMOL/L (ref 21–32)
CREAT SERPL-MCNC: 0.78 MG/DL (ref 0.6–1.3)
DIFFERENTIAL METHOD BLD: ABNORMAL
EOSINOPHIL # BLD: 0.4 K/UL (ref 0–0.4)
EOSINOPHIL NFR BLD: 4 % (ref 0–5)
ERYTHROCYTE [DISTWIDTH] IN BLOOD BY AUTOMATED COUNT: 27.8 % (ref 11.6–14.5)
FERRITIN SERPL-MCNC: 5 NG/ML (ref 8–388)
FOLATE SERPL-MCNC: 4.4 NG/ML (ref 3.1–17.5)
GLOBULIN SER CALC-MCNC: 4.7 G/DL (ref 2–4)
GLUCOSE SERPL-MCNC: 88 MG/DL (ref 74–99)
HCT VFR BLD AUTO: 31.6 % (ref 35–45)
HGB BLD-MCNC: 8.4 G/DL (ref 12–16)
IRON SATN MFR SERPL: 3 % (ref 20–50)
IRON SERPL-MCNC: 16 UG/DL (ref 50–175)
LYMPHOCYTES # BLD: 3 K/UL (ref 0.9–3.6)
LYMPHOCYTES NFR BLD: 36 % (ref 21–52)
MCH RBC QN AUTO: 16.2 PG (ref 24–34)
MCHC RBC AUTO-ENTMCNC: 26.6 G/DL (ref 31–37)
MCV RBC AUTO: 61 FL (ref 74–97)
MONOCYTES # BLD: 0.3 K/UL (ref 0.05–1.2)
MONOCYTES NFR BLD: 3 % (ref 3–10)
NEUTS SEG # BLD: 4.8 K/UL (ref 1.8–8)
NEUTS SEG NFR BLD: 57 % (ref 40–73)
PLATELET # BLD AUTO: 389 K/UL (ref 135–420)
POTASSIUM SERPL-SCNC: 3.8 MMOL/L (ref 3.5–5.5)
PROT SERPL-MCNC: 8.1 G/DL (ref 6.4–8.2)
RBC # BLD AUTO: 5.18 M/UL (ref 4.2–5.3)
SODIUM SERPL-SCNC: 137 MMOL/L (ref 136–145)
T4 FREE SERPL-MCNC: 1.1 NG/DL (ref 0.7–1.5)
TIBC SERPL-MCNC: 482 UG/DL (ref 250–450)
TSH SERPL DL<=0.05 MIU/L-ACNC: 1.31 UIU/ML (ref 0.36–3.74)
VIT B12 SERPL-MCNC: 361 PG/ML (ref 211–911)
WBC # BLD AUTO: 8.3 K/UL (ref 4.6–13.2)

## 2020-12-18 PROCEDURE — 83021 HEMOGLOBIN CHROMOTOGRAPHY: CPT

## 2020-12-18 PROCEDURE — 84439 ASSAY OF FREE THYROXINE: CPT

## 2020-12-18 PROCEDURE — 82607 VITAMIN B-12: CPT

## 2020-12-18 PROCEDURE — 85025 COMPLETE CBC W/AUTO DIFF WBC: CPT

## 2020-12-18 PROCEDURE — 80053 COMPREHEN METABOLIC PANEL: CPT

## 2020-12-18 PROCEDURE — 82728 ASSAY OF FERRITIN: CPT

## 2020-12-18 PROCEDURE — 36415 COLL VENOUS BLD VENIPUNCTURE: CPT

## 2020-12-18 PROCEDURE — 83540 ASSAY OF IRON: CPT

## 2020-12-18 NOTE — PROGRESS NOTES
[facility] Progress Note    Date: 2020    Name: Matty Garcia    MRN: [id number]         : 1985    Peripheral Lab Draw      Ms. Watson to [facility], ambulatory at 1500 accompanied by self. Pt was assessed and education was provided. Blood obtained peripherally from left arm x 1 attempt with butterfly needle and sent to lab for Cbc w/diff, Cmp, Iron Profile, Ferritin, Tsh, Free T4, Hemoglobin Fractionation, Vitamin B12 & Folate per written orders. No bleeding or hematoma noted at site. Gauze and coban applied. Ms. Kathryn Hathaway tolerated the phlebotomy, and had no complaints. Patient armband removed and shredded. Ms. Kathryn Hathaway was discharged from [place] [id number] in stable condition at 1510.     Marianna Burris Phlebotomist PCT  2020  3:29 PM

## 2020-12-30 ENCOUNTER — APPOINTMENT (OUTPATIENT)
Dept: INFUSION THERAPY | Age: 35
End: 2020-12-30

## 2021-01-05 RX ORDER — ONDANSETRON 2 MG/ML
8 INJECTION INTRAMUSCULAR; INTRAVENOUS AS NEEDED
Status: CANCELLED | OUTPATIENT
Start: 2021-01-06

## 2021-01-05 RX ORDER — SODIUM CHLORIDE 0.9 % (FLUSH) 0.9 %
10 SYRINGE (ML) INJECTION AS NEEDED
Status: CANCELLED | OUTPATIENT
Start: 2021-01-06

## 2021-01-05 RX ORDER — HEPARIN 100 UNIT/ML
300-500 SYRINGE INTRAVENOUS AS NEEDED
Status: CANCELLED
Start: 2021-01-06

## 2021-01-05 RX ORDER — EPINEPHRINE 1 MG/ML
0.3 INJECTION, SOLUTION, CONCENTRATE INTRAVENOUS AS NEEDED
Status: CANCELLED | OUTPATIENT
Start: 2021-01-06

## 2021-01-05 RX ORDER — ACETAMINOPHEN 325 MG/1
650 TABLET ORAL AS NEEDED
Status: CANCELLED
Start: 2021-01-06

## 2021-01-05 RX ORDER — HYDROCORTISONE SODIUM SUCCINATE 100 MG/2ML
100 INJECTION, POWDER, FOR SOLUTION INTRAMUSCULAR; INTRAVENOUS AS NEEDED
Status: CANCELLED | OUTPATIENT
Start: 2021-01-06

## 2021-01-05 RX ORDER — ALBUTEROL SULFATE 0.83 MG/ML
2.5 SOLUTION RESPIRATORY (INHALATION) AS NEEDED
Status: CANCELLED
Start: 2021-01-06

## 2021-01-05 RX ORDER — SODIUM CHLORIDE 9 MG/ML
25 INJECTION, SOLUTION INTRAVENOUS CONTINUOUS
Status: CANCELLED | OUTPATIENT
Start: 2021-01-06

## 2021-01-05 RX ORDER — DIPHENHYDRAMINE HYDROCHLORIDE 50 MG/ML
50 INJECTION, SOLUTION INTRAMUSCULAR; INTRAVENOUS AS NEEDED
Status: CANCELLED
Start: 2021-01-06

## 2021-01-05 RX ORDER — DIPHENHYDRAMINE HYDROCHLORIDE 50 MG/ML
25 INJECTION, SOLUTION INTRAMUSCULAR; INTRAVENOUS AS NEEDED
Status: CANCELLED
Start: 2021-01-06

## 2021-01-06 ENCOUNTER — HOSPITAL ENCOUNTER (OUTPATIENT)
Dept: INFUSION THERAPY | Age: 36
Discharge: HOME | End: 2021-01-06
Payer: MEDICAID

## 2021-01-06 DIAGNOSIS — D50.9 IRON DEFICIENCY ANEMIA, UNSPECIFIED IRON DEFICIENCY ANEMIA TYPE: Primary | ICD-10-CM

## 2021-01-06 DIAGNOSIS — D50.9 IRON DEFICIENCY ANEMIA, UNSPECIFIED IRON DEFICIENCY ANEMIA TYPE: ICD-10-CM

## 2021-01-06 PROCEDURE — 96366 THER/PROPH/DIAG IV INF ADDON: CPT

## 2021-01-06 PROCEDURE — 74011250636 HC RX REV CODE- 250/636: Performed by: INTERNAL MEDICINE

## 2021-01-06 PROCEDURE — 96365 THER/PROPH/DIAG IV INF INIT: CPT

## 2021-01-06 RX ORDER — SODIUM CHLORIDE 9 MG/ML
25 INJECTION, SOLUTION INTRAVENOUS CONTINUOUS
Status: CANCELLED | OUTPATIENT
Start: 2021-01-13

## 2021-01-06 RX ORDER — DIPHENHYDRAMINE HYDROCHLORIDE 50 MG/ML
25 INJECTION, SOLUTION INTRAMUSCULAR; INTRAVENOUS AS NEEDED
Status: CANCELLED
Start: 2021-01-13

## 2021-01-06 RX ORDER — HYDROCORTISONE SODIUM SUCCINATE 100 MG/2ML
100 INJECTION, POWDER, FOR SOLUTION INTRAMUSCULAR; INTRAVENOUS AS NEEDED
Status: CANCELLED | OUTPATIENT
Start: 2021-01-13

## 2021-01-06 RX ORDER — SODIUM CHLORIDE 9 MG/ML
10 INJECTION INTRAMUSCULAR; INTRAVENOUS; SUBCUTANEOUS AS NEEDED
Status: DISCONTINUED | OUTPATIENT
Start: 2021-01-06 | End: 2021-01-07 | Stop reason: HOSPADM

## 2021-01-06 RX ORDER — HEPARIN 100 UNIT/ML
300-500 SYRINGE INTRAVENOUS AS NEEDED
Status: CANCELLED
Start: 2021-01-13

## 2021-01-06 RX ORDER — SODIUM CHLORIDE 9 MG/ML
10 INJECTION INTRAMUSCULAR; INTRAVENOUS; SUBCUTANEOUS AS NEEDED
Status: CANCELLED | OUTPATIENT
Start: 2021-01-13

## 2021-01-06 RX ORDER — ONDANSETRON 2 MG/ML
8 INJECTION INTRAMUSCULAR; INTRAVENOUS AS NEEDED
Status: CANCELLED | OUTPATIENT
Start: 2021-01-13

## 2021-01-06 RX ORDER — ALBUTEROL SULFATE 0.83 MG/ML
2.5 SOLUTION RESPIRATORY (INHALATION) AS NEEDED
Status: CANCELLED
Start: 2021-01-13

## 2021-01-06 RX ORDER — SODIUM CHLORIDE 0.9 % (FLUSH) 0.9 %
10 SYRINGE (ML) INJECTION AS NEEDED
Status: CANCELLED | OUTPATIENT
Start: 2021-01-13

## 2021-01-06 RX ORDER — EPINEPHRINE 1 MG/ML
0.3 INJECTION, SOLUTION, CONCENTRATE INTRAVENOUS AS NEEDED
Status: CANCELLED | OUTPATIENT
Start: 2021-01-13

## 2021-01-06 RX ORDER — DIPHENHYDRAMINE HYDROCHLORIDE 50 MG/ML
50 INJECTION, SOLUTION INTRAMUSCULAR; INTRAVENOUS AS NEEDED
Status: CANCELLED
Start: 2021-01-13

## 2021-01-06 RX ORDER — ACETAMINOPHEN 325 MG/1
650 TABLET ORAL AS NEEDED
Status: CANCELLED
Start: 2021-01-13

## 2021-01-06 RX ADMIN — SODIUM CHLORIDE 300 MG: 900 INJECTION, SOLUTION INTRAVENOUS at 14:20

## 2021-01-06 NOTE — PROGRESS NOTES
[facility] Progress Note    Date: 2021    Name: Adilene Bee    MRN: [id number]         : 1985    Venofer Infusion 1 of 4    Ms. Watson to [facility], ambulatory, at 1410. Pt was assessed and education was provided. Ms. Sue Melendez vitals were reviewed and patient was observed for 5 minutes prior to treatment. Visit Vitals  /81   Pulse 99   Temp 97.7 °F (36.5 °C)       22 g PIV placed in LFA x 1 attempt. PIV flushed easily and had brisk blood return. Venofer 300 mg was infused at 167 ml/hr over 90 minutes. VS stable and pt denied complaints of itching, lip/tongue/facial swelling, SOB, CP or other complaints. Ms. Donavon Campos tolerated the infusion, and had no complaints. VS remained stable. Patient declined observation due to transportation. PIV flushed with NS 10 ml and removed. No bleeding or hematoma noted at site. Guaze and coban applied. Patient armband removed and shredded. Ms. Donavon Campos was discharged from [place] [address] in stable condition at 1610. She is to return 21 at 1300 for next appointment.     Art Walter RN  2021  5:20 PM

## 2021-01-08 VITALS — TEMPERATURE: 97.7 F | DIASTOLIC BLOOD PRESSURE: 81 MMHG | HEART RATE: 99 BPM | SYSTOLIC BLOOD PRESSURE: 128 MMHG

## 2021-01-13 ENCOUNTER — HOSPITAL ENCOUNTER (OUTPATIENT)
Dept: INFUSION THERAPY | Age: 36
End: 2021-01-13
Payer: MEDICAID

## 2021-01-13 DIAGNOSIS — D50.9 IRON DEFICIENCY ANEMIA, UNSPECIFIED IRON DEFICIENCY ANEMIA TYPE: ICD-10-CM

## 2021-01-15 ENCOUNTER — HOSPITAL ENCOUNTER (OUTPATIENT)
Dept: INFUSION THERAPY | Age: 36
Discharge: HOME | End: 2021-01-15
Payer: MEDICAID

## 2021-01-15 VITALS
SYSTOLIC BLOOD PRESSURE: 128 MMHG | TEMPERATURE: 97 F | RESPIRATION RATE: 18 BRPM | DIASTOLIC BLOOD PRESSURE: 78 MMHG | HEART RATE: 87 BPM | OXYGEN SATURATION: 99 %

## 2021-01-15 DIAGNOSIS — D50.9 IRON DEFICIENCY ANEMIA, UNSPECIFIED IRON DEFICIENCY ANEMIA TYPE: Primary | ICD-10-CM

## 2021-01-15 PROCEDURE — 74011250636 HC RX REV CODE- 250/636: Performed by: INTERNAL MEDICINE

## 2021-01-15 PROCEDURE — 96366 THER/PROPH/DIAG IV INF ADDON: CPT

## 2021-01-15 PROCEDURE — 96365 THER/PROPH/DIAG IV INF INIT: CPT

## 2021-01-15 RX ORDER — DIPHENHYDRAMINE HYDROCHLORIDE 50 MG/ML
25 INJECTION, SOLUTION INTRAMUSCULAR; INTRAVENOUS AS NEEDED
Status: CANCELLED
Start: 2021-01-20

## 2021-01-15 RX ORDER — ACETAMINOPHEN 325 MG/1
650 TABLET ORAL AS NEEDED
Status: CANCELLED
Start: 2021-01-20

## 2021-01-15 RX ORDER — SODIUM CHLORIDE 0.9 % (FLUSH) 0.9 %
10 SYRINGE (ML) INJECTION AS NEEDED
Status: CANCELLED | OUTPATIENT
Start: 2021-01-20

## 2021-01-15 RX ORDER — SODIUM CHLORIDE 9 MG/ML
25 INJECTION, SOLUTION INTRAVENOUS CONTINUOUS
Status: DISPENSED | OUTPATIENT
Start: 2021-01-15 | End: 2021-01-15

## 2021-01-15 RX ORDER — SODIUM CHLORIDE 9 MG/ML
10 INJECTION INTRAMUSCULAR; INTRAVENOUS; SUBCUTANEOUS AS NEEDED
Status: CANCELLED | OUTPATIENT
Start: 2021-01-20

## 2021-01-15 RX ORDER — SODIUM CHLORIDE 9 MG/ML
25 INJECTION, SOLUTION INTRAVENOUS CONTINUOUS
Status: CANCELLED | OUTPATIENT
Start: 2021-01-20

## 2021-01-15 RX ORDER — SODIUM CHLORIDE 0.9 % (FLUSH) 0.9 %
10 SYRINGE (ML) INJECTION AS NEEDED
Status: DISPENSED | OUTPATIENT
Start: 2021-01-15 | End: 2021-01-15

## 2021-01-15 RX ORDER — ALBUTEROL SULFATE 0.83 MG/ML
2.5 SOLUTION RESPIRATORY (INHALATION) AS NEEDED
Status: CANCELLED
Start: 2021-01-20

## 2021-01-15 RX ORDER — EPINEPHRINE 1 MG/ML
0.3 INJECTION, SOLUTION, CONCENTRATE INTRAVENOUS AS NEEDED
Status: CANCELLED | OUTPATIENT
Start: 2021-01-20

## 2021-01-15 RX ORDER — HYDROCORTISONE SODIUM SUCCINATE 100 MG/2ML
100 INJECTION, POWDER, FOR SOLUTION INTRAMUSCULAR; INTRAVENOUS AS NEEDED
Status: CANCELLED | OUTPATIENT
Start: 2021-01-20

## 2021-01-15 RX ORDER — DIPHENHYDRAMINE HYDROCHLORIDE 50 MG/ML
50 INJECTION, SOLUTION INTRAMUSCULAR; INTRAVENOUS AS NEEDED
Status: CANCELLED
Start: 2021-01-20

## 2021-01-15 RX ORDER — HEPARIN 100 UNIT/ML
300-500 SYRINGE INTRAVENOUS AS NEEDED
Status: CANCELLED
Start: 2021-01-20

## 2021-01-15 RX ORDER — ONDANSETRON 2 MG/ML
8 INJECTION INTRAMUSCULAR; INTRAVENOUS AS NEEDED
Status: CANCELLED | OUTPATIENT
Start: 2021-01-20

## 2021-01-15 RX ADMIN — Medication 10 ML: at 10:10

## 2021-01-15 RX ADMIN — SODIUM CHLORIDE 25 ML/HR: 9 INJECTION, SOLUTION INTRAVENOUS at 08:29

## 2021-01-15 RX ADMIN — SODIUM CHLORIDE 300 MG: 900 INJECTION, SOLUTION INTRAVENOUS at 08:29

## 2021-01-15 NOTE — PROGRESS NOTES
[facility] Progress Note    Date: January 15, 2021    Name: Sigrid Covarrubias    MRN: [id number]         : 1985    Venofer Infusion 2 of 4    Ms. Watson to [facility], at 6257. Pt was assessed and education was provided. Patient denies having any problems with previous infusion, except some bruising and irritation at previous iv site. Ms. Anastacio Everett vitals were reviewed and patient was observed for 5 minutes prior to treatment. Visit Vitals  /88 (BP 1 Location: Left arm, BP Patient Position: Sitting)   Pulse 85   Temp 97.5 °F (36.4 °C)   Resp 18   SpO2 99%   Breastfeeding No       24 g PIV placed in RFA x 1 attempt. PIV flushed easily and had brisk blood return. Venofer 300 mg was infused at 167 ml/hr over 90 minutes. VS stable and pt denied complaints of itching, lip/tongue/facial swelling, SOB, CP or other complaints. Ms. Roger Almonte tolerated the infusion, and had no complaints. VS remained stable. Patient declined observation due to transportation. PIV flushed with NS 10 ml and removed. No bleeding or hematoma noted at site. Guaze and coban applied. Patient armband removed and shredded. Ms. Roger Almonte was discharged from [place] [address] in stable condition at 1015. She is to return 21 at 1300 for next appointment.         Venkata Colon RN  January 15, 2021

## 2021-01-20 DIAGNOSIS — D50.9 IRON DEFICIENCY ANEMIA, UNSPECIFIED IRON DEFICIENCY ANEMIA TYPE: ICD-10-CM

## 2021-01-21 ENCOUNTER — HOSPITAL ENCOUNTER (OUTPATIENT)
Dept: INFUSION THERAPY | Age: 36
End: 2021-01-21

## 2021-01-27 DIAGNOSIS — D50.9 IRON DEFICIENCY ANEMIA, UNSPECIFIED IRON DEFICIENCY ANEMIA TYPE: ICD-10-CM

## 2021-01-28 ENCOUNTER — HOSPITAL ENCOUNTER (OUTPATIENT)
Dept: INFUSION THERAPY | Age: 36
End: 2021-01-28

## 2021-02-03 ENCOUNTER — VIRTUAL VISIT (OUTPATIENT)
Age: 36
End: 2021-02-03
Payer: MEDICAID

## 2021-02-03 DIAGNOSIS — E53.8 B12 DEFICIENCY: ICD-10-CM

## 2021-02-03 DIAGNOSIS — Z87.42 HX OF MENORRHAGIA: ICD-10-CM

## 2021-02-03 DIAGNOSIS — D50.9 MICROCYTIC HYPOCHROMIC ANEMIA: Primary | ICD-10-CM

## 2021-02-03 PROCEDURE — 99442 PR PHYS/QHP TELEPHONE EVALUATION 11-20 MIN: CPT | Performed by: INTERNAL MEDICINE

## 2021-02-03 NOTE — PROGRESS NOTES
Robbie Choudhary is a 28 y.o. female, evaluated via audio-only technology on 2/3/2021 for Anemia    PCP: Regina Jain NP    Assessment & Plan:     1. Iron deficiency anemia, unspecified iron deficiency anemia type  --Patient underwent gastric bypass in 2007  --12/12/2020: CBC showed WBC 8.3K/uL, hemoglobin 8.4g/dL with hematocrit of 31.6%. Platelet 098. Ferritin 5. Transferrin saturation 3%. TSH and Free T4 were normal. BUN and creatinine normal.   --Venofer 2/4 was completed on January 15, 2021. Her next dose 3/4 is scheduled on Feb 5, 2021. --Refer to GYN for menorrhagia  --Follow up in 3 months or sooner if indicated. 2. B12 deficiency  --12/18/2020: B12 was 361 with Folate 4.4.  --B12 supplementation SL daily     12  Subjective:   Ms. Robbie Choudhary is a 28 y.o. female who was evalutaed via audio-only technology. She states she completed 2 doses of Venofer already and her 3rd dose is scheduled on Friday 2/05/21. She states she has been tolerating this well without side effects. She states she still experiences heavy menses and requesting if she can be referred to a new GYN. She denies fatigue, shortness of breath, and dizziness. She denies fevers and infections. She denies pain or any discomfort. She does not have any concerns or complaints to report at this time. Prior to Admission medications    Medication Sig Start Date End Date Taking? Authorizing Provider   ferrous sulfate (IRON PO) Take  by mouth. Other, MD Jimena         Review of Systems   All other systems reviewed and are negative.     Labs:    Lab Results   Component Value Date/Time    WBC 8.3 12/18/2020 03:10 PM    Hemoglobin (POC) 4.5 02/02/2018 04:40 PM    Hemoglobin, POC 9.9 (L) 04/10/2011 03:36 AM    HGB 8.4 (L) 12/18/2020 03:10 PM    Hematocrit, POC 29 (L) 04/10/2011 03:36 AM    HCT 31.6 (L) 12/18/2020 03:10 PM    PLATELET 668 98/09/1608 03:10 PM    MCV 61.0 (L) 12/18/2020 03:10 PM     Lab Results   Component Value Date/Time    Sodium 137 12/18/2020 03:10 PM    Potassium 3.8 12/18/2020 03:10 PM    Chloride 106 12/18/2020 03:10 PM    CO2 26 12/18/2020 03:10 PM    Anion gap 5 12/18/2020 03:10 PM    Glucose 88 12/18/2020 03:10 PM    BUN 9 12/18/2020 03:10 PM    Creatinine 0.78 12/18/2020 03:10 PM    BUN/Creatinine ratio 12 12/18/2020 03:10 PM    GFR est AA >60 12/18/2020 03:10 PM    GFR est non-AA >60 12/18/2020 03:10 PM    Calcium 9.1 12/18/2020 03:10 PM    Bilirubin, total 0.4 12/18/2020 03:10 PM    Alk. phosphatase 79 12/18/2020 03:10 PM    Protein, total 8.1 12/18/2020 03:10 PM    Albumin 3.4 12/18/2020 03:10 PM    Globulin 4.7 (H) 12/18/2020 03:10 PM    A-G Ratio 0.7 (L) 12/18/2020 03:10 PM    ALT (SGPT) 19 12/18/2020 03:10 PM    AST (SGOT) 11 12/18/2020 03:10 PM     Lab Results   Component Value Date/Time    Iron 16 (L) 12/18/2020 03:10 PM    TIBC 482 (H) 12/18/2020 03:10 PM    Iron % saturation 3 (L) 12/18/2020 03:10 PM    Ferritin 5 (L) 12/18/2020 03:10 PM     Lab Results   Component Value Date/Time    Vitamin B12 361 12/18/2020 03:10 PM    Folate 4.4 12/18/2020 03:10 PM     Lab Results   Component Value Date/Time    TSH 1.31 12/18/2020 03:10 PM       Vale Gupta, who was evaluated through a patient-initiated, synchronous (real-time) audio only encounter, and/or her healthcare decision maker, is aware that it is a billable service, with coverage as determined by her insurance carrier. She provided verbal consent to proceed: Yes. She has not had a related appointment within my department in the past 7 days or scheduled within the next 24 hours.       Total Time: minutes: 11-20 minutes   Follow up in 3 months with repeat labs or sooner if indicated      Orders Placed This Encounter    CBC WITH AUTOMATED DIFF     Standing Status:   Future     Standing Expiration Date:   2/4/2022    IRON PROFILE     Standing Status:   Future     Standing Expiration Date:   8/7/2555    METABOLIC PANEL, COMPREHENSIVE     Standing Status:   Future     Standing Expiration Date:   2/4/2022    FERRITIN     Standing Status:   Future     Standing Expiration Date:   2/4/2022    VITAMIN B12 & FOLATE     Standing Status:   Future     Standing Expiration Date:   2/4/2022       Margarito Etienne MD  02/03/21

## 2021-02-04 ENCOUNTER — HOSPITAL ENCOUNTER (OUTPATIENT)
Dept: INFUSION THERAPY | Age: 36
End: 2021-02-04

## 2021-02-05 ENCOUNTER — HOSPITAL ENCOUNTER (OUTPATIENT)
Dept: INFUSION THERAPY | Age: 36
End: 2021-02-05

## 2021-02-05 DIAGNOSIS — D50.9 IRON DEFICIENCY ANEMIA, UNSPECIFIED IRON DEFICIENCY ANEMIA TYPE: ICD-10-CM

## 2021-02-25 ENCOUNTER — TRANSCRIBE ORDER (OUTPATIENT)
Dept: SCHEDULING | Age: 36
End: 2021-02-25

## 2021-02-25 DIAGNOSIS — R12 HEARTBURN: Primary | ICD-10-CM

## 2021-02-25 DIAGNOSIS — E66.01 MORBID OBESITY (HCC): ICD-10-CM

## 2021-09-17 ENCOUNTER — APPOINTMENT (OUTPATIENT)
Age: 36
Setting detail: DERMATOLOGY
End: 2021-09-17

## 2021-09-17 ENCOUNTER — APPOINTMENT (RX ONLY)
Age: 36
Setting detail: DERMATOLOGY
End: 2021-09-17

## 2021-09-17 DIAGNOSIS — L72.8 OTHER FOLLICULAR CYSTS OF THE SKIN AND SUBCUTANEOUS TISSUE: ICD-10-CM

## 2021-09-17 PROCEDURE — ? PRESCRIPTION

## 2021-09-17 PROCEDURE — ? COUNSELING

## 2021-09-17 PROCEDURE — ? INTRALESIONAL KENALOG

## 2021-09-17 PROCEDURE — 11900 INJECT SKIN LESIONS </W 7: CPT

## 2021-09-17 RX ORDER — DOXYCYCLINE HYCLATE 100 MG/1
1 TABLET, COATED ORAL BID
Qty: 20 | Refills: 0 | Status: ERX | COMMUNITY
Start: 2021-09-17

## 2021-09-17 RX ADMIN — DOXYCYCLINE HYCLATE 1: 100 TABLET, COATED ORAL at 00:00

## 2021-09-17 ASSESSMENT — LOCATION SIMPLE DESCRIPTION DERM
LOCATION SIMPLE: LEFT BUTTOCK
LOCATION SIMPLE: LEFT BUTTOCK

## 2021-09-17 ASSESSMENT — LOCATION DETAILED DESCRIPTION DERM
LOCATION DETAILED: LEFT MEDIAL BUTTOCK
LOCATION DETAILED: LEFT MEDIAL BUTTOCK

## 2021-09-17 ASSESSMENT — LOCATION ZONE DERM
LOCATION ZONE: TRUNK
LOCATION ZONE: TRUNK

## 2021-09-17 NOTE — PROCEDURE: INTRALESIONAL KENALOG
Expiration Date For Kenalog (Optional): 5/2021
Administered By (Optional): Lali Henson, TYREE
Total Volume (Ccs): 0.4
X Size Of Lesion In Cm (Optional): 0
Detail Level: Detailed
Include Z78.9 (Other Specified Conditions Influencing Health Status) As An Associated Diagnosis?: No
Ndc# For Kenalog Only: 0345-3435-07
Treatment Number (Optional): 1
Consent: The risks of atrophy were reviewed with the patient.
Concentration Of Kenalog Solution Injected (Mg/Ml): 10.0
Validate Note Data When Using Inventory: Yes
Kenalog Preparation: Kenalog with 1% lidocaine without epinephrine
Medical Necessity Clause: This procedure was medically necessary because the lesions that were treated were:
Size Of Lesion (Optional): 3
Lot # For Kenalog (Optional): [id number]

## 2021-09-17 NOTE — PROCEDURE: MIPS QUALITY
Quality 431: Preventive Care And Screening: Unhealthy Alcohol Use - Screening: Patient not identified as an unhealthy alcohol user when screened for unhealthy alcohol use using a systematic screening method
Detail Level: Detailed
Quality 226: Preventive Care And Screening: Tobacco Use: Screening And Cessation Intervention: Patient screened for tobacco use and is an ex/non-smoker
Quality 110: Preventive Care And Screening: Influenza Immunization: Influenza Immunization not Administered for Documented Reasons.
Quality 111:Pneumonia Vaccination Status For Older Adults: Pneumococcal Vaccination not Administered or Previously Received, Reason not Otherwise Specified
no

## 2021-09-17 NOTE — PROCEDURE: INTRALESIONAL KENALOG
Detail Level: Detailed
Expiration Date For Kenalog (Optional): 5/2021
Ndc# For Kenalog Only: 7945-0446-77
X Size Of Lesion In Cm (Optional): 0
Concentration Of Kenalog Solution Injected (Mg/Ml): 10.0
Total Volume (Ccs): 0.4
Lot # For Kenalog (Optional): [id number]
Kenalog Preparation: Kenalog with 1% lidocaine without epinephrine
Consent: The risks of atrophy were reviewed with the patient.
Include Z78.9 (Other Specified Conditions Influencing Health Status) As An Associated Diagnosis?: No
Size Of Lesion (Optional): 3
Validate Note Data When Using Inventory: Yes
Medical Necessity Clause: This procedure was medically necessary because the lesions that were treated were:
Administered By (Optional): Kaley Soares, ROGER
Treatment Number (Optional): 1

## 2021-09-17 NOTE — PROCEDURE: MIPS QUALITY
Quality 226: Preventive Care And Screening: Tobacco Use: Screening And Cessation Intervention: Patient screened for tobacco use and is an ex/non-smoker
Detail Level: Detailed
Quality 431: Preventive Care And Screening: Unhealthy Alcohol Use - Screening: Patient not identified as an unhealthy alcohol user when screened for unhealthy alcohol use using a systematic screening method
Quality 111:Pneumonia Vaccination Status For Older Adults: Pneumococcal Vaccination not Administered or Previously Received, Reason not Otherwise Specified
Quality 110: Preventive Care And Screening: Influenza Immunization: Influenza Immunization not Administered for Documented Reasons.

## 2025-04-05 NOTE — ADDENDUM NOTE
Addended byAnitha SANTIAGO on: 7/25/2018 02:10 PM     Modules accepted: Orders Transfer Center Handoff for Behavioral Health Transfers      Patient's Current Location: [facility] EMERGENCY DEPARTMENT     Chief Complaint   Patient presents with    Mental Health Problem       Current or History of Violent Behavior: No    Currently in Restraints Now or During this Encounter: No  (Specify if Agitation or self harm is noted in ED?)  If yes, please describe behaviors requiring restraint:             Medical Clearance Documented and Verified in the Chart: Yes    No Known Allergies     Can Patient Tolerate Lying Flat: Yes    Able to Perform ADLs:  Yes  (Specify if able to ambulate or uses any mobility devices such as cane or walker)  Activity:    Level of Assistance:    Assistive Device:    Miscellaneous Devices:      LABS    CBC:   Lab Results   Component Value Date/Time    WBC 7.2 04/04/2025 09:35 PM    RBC 5.17 04/04/2025 09:35 PM    HGB 15.3 04/04/2025 09:35 PM    HCT 46.2 04/04/2025 09:35 PM    MCV 89.4 04/04/2025 09:35 PM    MCH 29.6 04/04/2025 09:35 PM    MCHC 33.1 04/04/2025 09:35 PM    RDW 13.2 04/04/2025 09:35 PM     04/04/2025 09:35 PM    MPV 9.9 04/04/2025 09:35 PM     CMP:   Lab Results   Component Value Date/Time     04/04/2025 09:35 PM    K 3.4 04/04/2025 09:35 PM     04/04/2025 09:35 PM    CO2 30 04/04/2025 09:35 PM    BUN 10 04/04/2025 09:35 PM    CREATININE 1.18 04/04/2025 09:35 PM    GFRAA >60 01/07/2025 12:47 AM    AGRATIO 1.3 06/13/2020 10:57 PM    LABGLOM 86 04/04/2025 09:35 PM    LABGLOM >60 03/17/2024 09:09 PM    GLUCOSE 120 04/04/2025 09:35 PM    GLUCOSE 105 01/07/2025 12:47 AM    CALCIUM 9.5 04/04/2025 09:35 PM    BILITOT 0.4 02/09/2025 12:40 AM    ALKPHOS 84 02/09/2025 12:40 AM    ALKPHOS 71 01/07/2025 12:47 AM    AST 31 02/09/2025 12:40 AM    ALT 41 02/09/2025 12:40 AM     Drug Panel:   Lab Results   Component Value Date/Time    AMPHETAMUR Negative 04/04/2025 09:35 PM    BARBITURATUR Negative 04/04/2025 09:35 PM    COCAINEUR Positive